# Patient Record
Sex: MALE | Race: WHITE | Employment: OTHER | URBAN - METROPOLITAN AREA
[De-identification: names, ages, dates, MRNs, and addresses within clinical notes are randomized per-mention and may not be internally consistent; named-entity substitution may affect disease eponyms.]

---

## 2017-05-19 ENCOUNTER — OFFICE VISIT (OUTPATIENT)
Dept: FAMILY MEDICINE CLINIC | Age: 77
End: 2017-05-19

## 2017-05-19 VITALS
TEMPERATURE: 96.2 F | SYSTOLIC BLOOD PRESSURE: 121 MMHG | BODY MASS INDEX: 29.2 KG/M2 | RESPIRATION RATE: 18 BRPM | HEART RATE: 70 BPM | HEIGHT: 70 IN | DIASTOLIC BLOOD PRESSURE: 60 MMHG | OXYGEN SATURATION: 99 % | WEIGHT: 204 LBS

## 2017-05-19 DIAGNOSIS — M25.551 RIGHT HIP PAIN: Primary | ICD-10-CM

## 2017-05-19 DIAGNOSIS — I10 ESSENTIAL HYPERTENSION: ICD-10-CM

## 2017-05-19 NOTE — PROGRESS NOTES
HISTORY OF PRESENT ILLNESS  Ngozi Wild is a 76 y.o. male. HPI  hbp stable  C/o r hip pain  Review of Systems   Musculoskeletal: Positive for joint pain. All other systems reviewed and are negative. Past Medical History:   Diagnosis Date    Asthma     Crohn disease ([address]     Essential hypertension     Gross hematuria     High cholesterol     History of positive PPD     Hypertrophy of prostate with urinary obstruction and other lower urinary tract symptoms (LUTS)     Impotence of organic origin     Prostate CA ([facility])     [id number], No, Mx, Gl3+4    Prostatitis, unspecified     Urinary frequency        Current Outpatient Prescriptions:     amLODIPine (NORVASC) 5 mg tablet, Take 1 Tab by mouth daily. , Disp: 90 Tab, Rfl: 1    Cetirizine 10 mg cap, Take  by mouth., Disp: , Rfl:     lactobacillus acidophilus (BACID) cap, Take 2 Caps by mouth two (2) times a day., Disp: , Rfl:     PSYLLIUM SEED, WITH SUGAR, (METAMUCIL PO), Take  by mouth., Disp: , Rfl:     budesonide (ENTOCORT EC) 3 mg capsule, Take 6 mg by mouth every morning.  , Disp: , Rfl:     cyanocobalamin (VITAMIN B12) 1,000 mcg/mL injection, 1,000 mcg by IntraMUSCular route once.  , Disp: , Rfl:     lisinopril (PRINIVIL, ZESTRIL) 40 mg tablet, Take 40 mg by mouth daily. , Disp: , Rfl:     ranitidine (ZANTAC) 150 mg tablet, Take 150 mg by mouth two (2) times a day.  , Disp: , Rfl:     gabapentin (NEURONTIN) 100 mg capsule, Take  by mouth three (3) times daily. , Disp: , Rfl:     tiotropium (SPIRIVA WITH HANDIHALER) 18 mcg inhalation capsule, Take 1 Cap by inhalation daily. , Disp: , Rfl:     fluticasone (FLONASE) 50 mcg/Actuation nasal spray, nightly.  , Disp: , Rfl:     MONTELUKAST SODIUM (SINGULAIR PO), Take  by mouth.  , Disp: , Rfl:     NORTRIPTYLINE HCL (PAMELOR PO), Take  by mouth nightly as needed. , Disp: , Rfl:     CALCIUM CARBONATE/VITAMIN D3 (CALTRATE 600 + D PO), Take  by mouth.  , Disp: , Rfl:     FENOFIBRATE NANOCRYSTALLIZED (TRICOR PO), Take  by mouth.  , Disp: , Rfl:     albuterol (PROVENTIL VENTOLIN) 2.5 mg /3 mL (0.083 %) nebulizer solution, by Nebulization route once.  , Disp: , Rfl:     fluticasone (FLOVENT DISKUS) 100 mcg/Actuation DsDv, Take  by inhalation. , Disp: , Rfl:     DOCUSATE CALCIUM (SURFAK PO), Take  by mouth.  , Disp: , Rfl:     latanoprost (XALATAN) 0.005 % ophthalmic solution, Administer 1 Drop to both eyes nightly.  , Disp: , Rfl:     diazepam (VALIUM) 5 mg tablet, 5 mg., Disp: , Rfl:     finasteride (PROSCAR) 5 mg tablet, Take 1 Tab by mouth daily. , Disp: 90 Tab, Rfl: 3  Visit Vitals    /60 (BP 1 Location: Right arm, BP Patient Position: Sitting)    Pulse 70    Temp 96.2 °F (35.7 °C) (Oral)    Resp 18    Ht 5' 10\" (1.778 m)    Wt 204 lb (92.5 kg)    SpO2 99%    BMI 29.27 kg/m2         Physical Exam   Constitutional: He appears well-developed and well-nourished. No distress. Musculoskeletal: He exhibits no edema, tenderness or deformity. - hip tenderness, good rom   Skin: He is not diaphoretic. Vitals reviewed.       ASSESSMENT and PLAN  hip pain   hbp  Plan  xr  Continue rx  Recheck in 6 months

## 2017-05-19 NOTE — MR AVS SNAPSHOT
Visit Information Date & Time Provider Department Dept. Phone Encounter #  
 5/19/2017  8:30 AM Antione Roberts, [facility] [phone] [id number] Follow-up Instructions Return in about 6 months (around 11/19/2017). Follow-up and Disposition History Your Appointments 6/7/2017  1:15 PM  
ESTABLISHED PATIENT with Cami Harding MD  
[facility] ([facility]) Appt Note: Return in about 6 months (around 6/15/2017) for FU with prior PSA. [address], [address]  
  
   
 [address], [address] [facility] [address] Upcoming Health Maintenance Date Due  
 GLAUCOMA SCREENING Q2Y 3/20/2017 MEDICARE YEARLY EXAM 7/20/2017 INFLUENZA AGE 9 TO ADULT 8/1/2017 DTaP/Tdap/Td series (2 - Td) 7/19/2026 Allergies as of 5/19/2017  Review Complete On: 5/19/2017 By: Antione Roberts MD  
 No Known Allergies Current Immunizations  Never Reviewed No immunizations on file. Not reviewed this visit You Were Diagnosed With   
  
 Codes Comments Right hip pain    -  Primary ICD-10-CM: M25.551 ICD-9-CM: 719.45 Essential hypertension     ICD-10-CM: I10 
ICD-9-CM: 401.9 Vitals BP Pulse Temp Resp Height(growth percentile) Weight(growth percentile) 121/60 (BP 1 Location: Right arm, BP Patient Position: Sitting) 70 96.2 °F (35.7 °C) (Oral) 18 5' 10\" (1.778 m) 204 lb (92.5 kg) SpO2 BMI Smoking Status 99% 29.27 kg/m2 Former Smoker BMI and BSA Data Body Mass Index Body Surface Area  
 29.27 kg/m 2 2.14 m 2 Preferred Pharmacy Pharmacy Name Phone [facility] [address], [address] [place] [address] [phone] Your Updated Medication List  
  
   
This list is accurate as of: 5/19/17  8:47 AM.  Always use your most recent med list.  
  
  
  
  
 albuterol 2.5 mg /3 mL (0.083 %) nebulizer solution Commonly known as:  PROVENTIL VENTOLIN  
by Nebulization route once. amLODIPine 5 mg tablet Commonly known as:  Benjiman Floro Take 1 Tab by mouth daily. CALTRATE 600 + D PO Take  by mouth. Cetirizine 10 mg Cap Take  by mouth.  
  
 cyanocobalamin 1,000 mcg/mL injection Commonly known as:  VITAMIN B12  
1,000 mcg by IntraMUSCular route once. ENTOCORT EC 3 mg capsule Generic drug:  budesonide Take 6 mg by mouth every morning. finasteride 5 mg tablet Commonly known as:  PROSCAR Take 1 Tab by mouth daily. * FLONASE 50 mcg/actuation nasal spray Generic drug:  fluticasone  
nightly. * FLOVENT DISKUS 100 mcg/actuation Dsdv Generic drug:  fluticasone Take  by inhalation. Lactobacillus acidophilus Cap Commonly known as:  BACID Take 2 Caps by mouth two (2) times a day. lisinopril 40 mg tablet Commonly known as:  Rexanne  Take 40 mg by mouth daily. METAMUCIL PO Take  by mouth. NEURONTIN 100 mg capsule Generic drug:  gabapentin Take  by mouth three (3) times daily. PAMELOR PO Take  by mouth nightly as needed. SINGULAIR PO Take  by mouth. SPIRIVA WITH HANDIHALER 18 mcg inhalation capsule Generic drug:  tiotropium Take 1 Cap by inhalation daily. SURFAK PO Take  by mouth. TRICOR PO Take  by mouth. VALIUM 5 mg tablet Generic drug:  diazePAM  
5 mg. XALATAN 0.005 % ophthalmic solution Generic drug:  latanoprost  
Administer 1 Drop to both eyes nightly. ZANTAC 150 mg tablet Generic drug:  raNITIdine Take 150 mg by mouth two (2) times a day. * Notice: This list has 2 medication(s) that are the same as other medications prescribed for you. Read the directions carefully, and ask your doctor or other care provider to review them with you. Follow-up Instructions Return in about 6 months (around 11/19/2017). To-Do List   
 05/19/2017 Imaging:  XR HIP RT W OR WO PELV 2-3 VWS Introducing [address]! Dear Lavena Ganser: Thank you for requesting a [employer] account. Our records indicate that you have previously registered for a [employer] account but its currently inactive. Please call our [employer] support line at [phone]. Additional Information If you have questions, please visit the Frequently Asked Questions section of the [employer] website at [email or web address] Remember, [employer] is NOT to be used for urgent needs. For medical emergencies, dial 911. Now available from your iPhone and Android! Please provide this summary of care documentation to your next provider. Your primary care clinician is listed as Petey Arredondo. If you have any questions after today's visit, please call [phone].

## 2017-05-19 NOTE — PROGRESS NOTES
Chief Complaint   Patient presents with    Hypertension     follow up    Asthma     follow up    Hip Pain     RT hip, pain scale 2/10     1. Have you been to the ER, urgent care clinic since your last visit? Hospitalized since your last visit? No    2. Have you seen or consulted any other health care providers outside of the [address] [place] [address] since your last visit? Include any pap smears or colon screening.  Yes [facility]

## 2017-06-15 ENCOUNTER — PATIENT OUTREACH (OUTPATIENT)
Dept: FAMILY MEDICINE CLINIC | Age: 77
End: 2017-06-15

## 2017-06-15 PROBLEM — R29.898 WEAKNESS OF LOWER EXTREMITY: Status: ACTIVE | Noted: 2017-06-11

## 2017-06-15 RX ORDER — FLUTICASONE PROPIONATE AND SALMETEROL 250; 50 UG/1; UG/1
1 POWDER RESPIRATORY (INHALATION) DAILY
COMMUNITY

## 2017-06-15 NOTE — PROGRESS NOTES
.  Transitional Care Nurse Navigator Note:  Hospital Follow Up for [facility]ANDU Admission from 6/11 - 13/2017 for Left leg weakness. RRAT score: 22 High  Medical History:     Past Medical History:   Diagnosis Date    Asthma     Crohn disease ([address]     Essential hypertension     Gross hematuria     High cholesterol     History of positive PPD     Hypertrophy of prostate with urinary obstruction and other lower urinary tract symptoms (LUTS)     Impotence of organic origin     Prostate CA ([facility])     [id number], No, Mx, Gl3+4    Prostatitis, unspecified     Urinary frequency        Patient presenting symptoms Left leg weakness    Diagnosed with Left leg Numbness. Admitted to Hospitalist Service     Course of current Hospitalization (referenced by Naren Irene MD - 06/13/2017   note): This is a 76year old male with numbness and tingling along the lateral aspect of his left leg. This is old but has been worse lately. The feeling comes and goes. He has a history of both peripheral neuropathy plus L spine stenosis. He was ruled out for acute CVA. His symptoms are more consistent with a peripheral nerve issue, but the L spine stenosis might be playing a role as well. He has had 2 prior L spine surgeries. I don't think that he needs another surgery right now, but he will follow up with his surgeon to review the MRI and get an opinion. He is dismissed in good condition. Significant Lab/Diagnostic Findings: MRI L-spine showing  central stenosis in part related to epidural lipomatosis is high-grade moderate and approaching severe at L3-L4 and slightly less severe at L2-L3  Medication Reconciliation completed: YES      This represents Transitions of Care because NN spoke with patient  within 2 business days of discharge. Pt's TCM follow up appt is scheduled with Dr. Saira Flores on Monday 6/19/2017 @ 10:15  which is within 6 days of discharge.     Called patient on 6/15/2017 x 2 then patient returned call had to change the first appointment date and verified with 2 identifiers.

## 2017-06-19 ENCOUNTER — OFFICE VISIT (OUTPATIENT)
Dept: FAMILY MEDICINE CLINIC | Age: 77
End: 2017-06-19

## 2017-06-19 VITALS
HEIGHT: 70 IN | BODY MASS INDEX: 29.2 KG/M2 | RESPIRATION RATE: 18 BRPM | HEART RATE: 61 BPM | WEIGHT: 204 LBS | SYSTOLIC BLOOD PRESSURE: 118 MMHG | OXYGEN SATURATION: 99 % | DIASTOLIC BLOOD PRESSURE: 63 MMHG | TEMPERATURE: 96.1 F

## 2017-06-19 DIAGNOSIS — M54.16 LUMBAR RADICULOPATHY: ICD-10-CM

## 2017-06-19 DIAGNOSIS — R09.89 BRUIT OF RIGHT CAROTID ARTERY: Primary | ICD-10-CM

## 2017-06-19 DIAGNOSIS — I67.9 CEREBROVASCULAR DISEASE: ICD-10-CM

## 2017-06-19 NOTE — PROGRESS NOTES
HISTORY OF PRESENT ILLNESS  Denis Coreas is a 76 y.o. male. HPI  EVARISTO  Admitted 06/11/17,SPA left leg weakness  D/c 06/13/201, 7lumbar radiculopathy  Contacted NN 06/15/2017  No residual symptoms  Review of Systems   Neurological: Positive for focal weakness. All other systems reviewed and are negative. Past Medical History:   Diagnosis Date    Asthma     Crohn disease ([address]     Essential hypertension     Gross hematuria     High cholesterol     History of positive PPD     Hypertrophy of prostate with urinary obstruction and other lower urinary tract symptoms (LUTS)     Impotence of organic origin     Prostate CA ([facility]CC)     [id number], No, Mx, Gl3+4    Prostatitis, unspecified     Urinary frequency      Current Outpatient Prescriptions on File Prior to Visit   Medication Sig Dispense Refill    Lactobac 40-Bifido 3-S.thermop (PROBIOTIC) 100 billion cell cap Take 1 Tab by mouth daily.  fluticasone-salmeterol (ADVAIR) 250-50 mcg/dose diskus inhaler Take 1 Puff by inhalation daily.  ALBUTEROL IN Take 1-2 Puffs by inhalation every four (4) hours as needed for Wheezing.  doxycycline (VIBRAMYCIN) 100 mg capsule       amLODIPine (NORVASC) 5 mg tablet Take 1 Tab by mouth daily. 90 Tab 1    Cetirizine 10 mg cap Take  by mouth.  lactobacillus acidophilus (BACID) cap Take 2 Caps by mouth two (2) times a day.  PSYLLIUM SEED, WITH SUGAR, (METAMUCIL PO) Take  by mouth.  budesonide (ENTOCORT EC) 3 mg capsule Take 6 mg by mouth every morning.  cyanocobalamin (VITAMIN B12) 1,000 mcg/mL injection 1,000 mcg by IntraMUSCular route once.  lisinopril (PRINIVIL, ZESTRIL) 40 mg tablet Take 40 mg by mouth daily.  ranitidine (ZANTAC) 150 mg tablet Take 150 mg by mouth two (2) times a day.  gabapentin (NEURONTIN) 100 mg capsule Take  by mouth three (3) times daily.  tiotropium (SPIRIVA WITH HANDIHALER) 18 mcg inhalation capsule Take 1 Cap by inhalation daily.  fluticasone (FLONASE) 50 mcg/Actuation nasal spray nightly.  MONTELUKAST SODIUM (SINGULAIR PO) Take  by mouth.  NORTRIPTYLINE HCL (PAMELOR PO) Take  by mouth nightly as needed.  CALCIUM CARBONATE/VITAMIN D3 (CALTRATE 600 + D PO) Take  by mouth.  FENOFIBRATE NANOCRYSTALLIZED (TRICOR PO) Take  by mouth.  albuterol (PROVENTIL VENTOLIN) 2.5 mg /3 mL (0.083 %) nebulizer solution by Nebulization route once.  fluticasone (FLOVENT DISKUS) 100 mcg/Actuation DsDv Take  by inhalation.  DOCUSATE CALCIUM (SURFAK PO) Take  by mouth.  latanoprost (XALATAN) 0.005 % ophthalmic solution Administer 1 Drop to both eyes nightly.  predniSONE (DELTASONE) 20 mg tablet       diazepam (VALIUM) 5 mg tablet 5 mg.  finasteride (PROSCAR) 5 mg tablet Take 1 Tab by mouth daily. 90 Tab 3     No current facility-administered medications on file prior to visit. Visit Vitals    /63 (BP 1 Location: Right arm, BP Patient Position: Sitting)    Pulse 61    Temp 96.1 °F (35.6 °C) (Oral)    Resp 18    Ht 5' 10\" (1.778 m)    Wt 204 lb (92.5 kg)    SpO2 99%    BMI 29.27 kg/m2         Physical Exam   Constitutional: He is oriented to person, place, and time. He appears well-developed and well-nourished. No distress. Neck: Normal range of motion. Neck supple. No thyromegaly present. Slight carotid bruit on eight   Cardiovascular: Normal rate, regular rhythm, normal heart sounds and intact distal pulses. Exam reveals no gallop and no friction rub. No murmur heard. Lymphadenopathy:     He has no cervical adenopathy. Neurological: He is alert and oriented to person, place, and time. He displays normal reflexes. No cranial nerve deficit. He exhibits normal muscle tone. Coordination normal.   - teres drift  Good finger grasp  Gait normal   Skin: He is not diaphoretic. Vitals reviewed.   reviewed hospital records  + small vessel disease  Carotid stenosis  Mri L-spine, + nerve root compression  ASSESSMENT and PLAN  lumbar radiculopathy   Carotid bruit  cerebrovascular disease  plan  Patient intolerant to aspirin  Recheck MRA in 1 year

## 2017-06-19 NOTE — MR AVS SNAPSHOT
Visit Information Date & Time Provider Department Dept. Phone Encounter #  
 6/19/2017 10:15 AM Jenna Holland MD 3 [facility] [phone] [id number] Follow-up Instructions Return as planned. Follow-up and Disposition History Your Appointments 11/20/2017  8:30 AM  
Follow Up with Jenna Holland MD  
3 [place] [place] [address]) Appt Note: 6 month f/u  
 [address] [facility] [address]  
[phone]  
  
   
 [address]  
  
    
 12/6/2017  8:50 AM  
Nurse Visit with [facility] NURSE [facility]) Appt Note: tech psa  
 [address] [facility] [address] [phone]  
  
    
 6/11/2018 10:00 AM  
ESTABLISHED PATIENT with Katerin Gonzales MD  
[facility] ([address]) Appt Note: 1 year with psa  
 [address], [address]  
  
   
 [address], [address] [facility] [address] Upcoming Health Maintenance Date Due  
 GLAUCOMA SCREENING Q2Y 3/20/2017 MEDICARE YEARLY EXAM 7/20/2017 INFLUENZA AGE 9 TO ADULT 8/1/2017 DTaP/Tdap/Td series (2 - Td) 7/19/2026 Allergies as of 6/19/2017  Review Complete On: 6/19/2017 By: Jenna Holland MD  
 No Known Allergies Current Immunizations  Never Reviewed Name Date Influenza Vaccine 10/24/2014 12:00 AM  
 Pneumococcal Polysaccharide (PPSV-23) 11/24/2012 12:00 AM  
  
 Not reviewed this visit You Were Diagnosed With   
  
 Codes Comments Bruit of right carotid artery    -  Primary ICD-10-CM: R09.89 ICD-9-CM: 694. 9 Cerebrovascular disease     ICD-10-CM: I67.9 ICD-9-CM: 437.9 Lumbar radiculopathy     ICD-10-CM: M54.16 
ICD-9-CM: 724.4 Vitals BP Pulse Temp Resp Height(growth percentile) Weight(growth percentile)  
 118/63 (BP 1 Location: Right arm, BP Patient Position: Sitting) 61 96.1 °F (35.6 °C) (Oral) 18 5' 10\" (1.778 m) 204 lb (92.5 kg) SpO2 BMI Smoking Status 99% 29.27 kg/m2 Former Smoker BMI and BSA Data Body Mass Index Body Surface Area  
 29.27 kg/m 2 2.14 m 2 Preferred Pharmacy Pharmacy Name Phone [facility] [address], [address] [place] [address] [phone] Your Updated Medication List  
  
   
This list is accurate as of: 6/19/17 10:51 AM.  Always use your most recent med list.  
  
  
  
  
 albuterol 2.5 mg /3 mL (0.083 %) nebulizer solution Commonly known as:  PROVENTIL VENTOLIN  
by Nebulization route once. ALBUTEROL IN Take 1-2 Puffs by inhalation every four (4) hours as needed for Wheezing. amLODIPine 5 mg tablet Commonly known as:  Jose Ada Take 1 Tab by mouth daily. CALTRATE 600 + D PO Take  by mouth. Cetirizine 10 mg Cap Take  by mouth.  
  
 cyanocobalamin 1,000 mcg/mL injection Commonly known as:  VITAMIN B12  
1,000 mcg by IntraMUSCular route once. doxycycline 100 mg capsule Commonly known as:  VIBRAMYCIN  
  
 ENTOCORT EC 3 mg capsule Generic drug:  budesonide Take 6 mg by mouth every morning. finasteride 5 mg tablet Commonly known as:  PROSCAR Take 1 Tab by mouth daily. * FLONASE 50 mcg/actuation nasal spray Generic drug:  fluticasone  
nightly. * FLOVENT DISKUS 100 mcg/actuation Dsdv Generic drug:  fluticasone Take  by inhalation. fluticasone-salmeterol 250-50 mcg/dose diskus inhaler Commonly known as:  ADVAIR Take 1 Puff by inhalation daily. Lactobacillus acidophilus Cap Commonly known as:  BACID Take 2 Caps by mouth two (2) times a day. lisinopril 40 mg tablet Commonly known as:  Shirlie Holes Take 40 mg by mouth daily. METAMUCIL PO Take  by mouth. NEURONTIN 100 mg capsule Generic drug:  gabapentin Take  by mouth three (3) times daily. PAMELOR PO Take  by mouth nightly as needed. predniSONE 20 mg tablet Commonly known as:  DELTASONE  
  
 PROBIOTIC 100 billion cell Cap Generic drug:  Lactobac 40-Bifido 3-S.thermop Take 1 Tab by mouth daily. SINGULAIR PO Take  by mouth. SPIRIVA WITH HANDIHALER 18 mcg inhalation capsule Generic drug:  tiotropium Take 1 Cap by inhalation daily. SURFAK PO Take  by mouth. TRICOR PO Take  by mouth. VALIUM 5 mg tablet Generic drug:  diazePAM  
5 mg. XALATAN 0.005 % ophthalmic solution Generic drug:  latanoprost  
Administer 1 Drop to both eyes nightly. ZANTAC 150 mg tablet Generic drug:  raNITIdine Take 150 mg by mouth two (2) times a day. * Notice: This list has 2 medication(s) that are the same as other medications prescribed for you. Read the directions carefully, and ask your doctor or other care provider to review them with you. Follow-up Instructions Return as planned. Introducing [facility]! Dear Florian Clifton: Thank you for requesting a [email or web address] account. Our records indicate that you have previously registered for a [email or web address] account but its currently inactive. Please call our [email or web address] support line at [phone]. Additional Information If you have questions, please visit the Frequently Asked Questions section of the [email or web address] website at [email or web address] Remember, [email or web address] is NOT to be used for urgent needs. For medical emergencies, dial 911. Now available from your iPhone and Android! Please provide this summary of care documentation to your next provider. Your primary care clinician is listed as Walter Farrell. If you have any questions after today's visit, please call [phone].

## 2017-06-19 NOTE — PROGRESS NOTES
HISTORY OF PRESENT ILLNESS  Kyler Quiroz is a 76 y.o. male. HPI  aodm stable  hbp stable  S/p abdominal pannus surgery  Review of Systems   All other systems reviewed and are negative. Past Medical History:   Diagnosis Date    Asthma     Crohn disease ([address]     Essential hypertension     Gross hematuria     High cholesterol     History of positive PPD     Hypertrophy of prostate with urinary obstruction and other lower urinary tract symptoms (LUTS)     Impotence of organic origin     Prostate CA ([facility])     [id number], No, Mx, Gl3+4    Prostatitis, unspecified     Urinary frequency        Current Outpatient Prescriptions:     Lactobac 40-Bifido 3-S.thermop (PROBIOTIC) 100 billion cell cap, Take 1 Tab by mouth daily. , Disp: , Rfl:     fluticasone-salmeterol (ADVAIR) 250-50 mcg/dose diskus inhaler, Take 1 Puff by inhalation daily. , Disp: , Rfl:     ALBUTEROL IN, Take 1-2 Puffs by inhalation every four (4) hours as needed for Wheezing., Disp: , Rfl:     doxycycline (VIBRAMYCIN) 100 mg capsule, , Disp: , Rfl:     amLODIPine (NORVASC) 5 mg tablet, Take 1 Tab by mouth daily. , Disp: 90 Tab, Rfl: 1    Cetirizine 10 mg cap, Take  by mouth., Disp: , Rfl:     lactobacillus acidophilus (BACID) cap, Take 2 Caps by mouth two (2) times a day., Disp: , Rfl:     PSYLLIUM SEED, WITH SUGAR, (METAMUCIL PO), Take  by mouth., Disp: , Rfl:     budesonide (ENTOCORT EC) 3 mg capsule, Take 6 mg by mouth every morning.  , Disp: , Rfl:     cyanocobalamin (VITAMIN B12) 1,000 mcg/mL injection, 1,000 mcg by IntraMUSCular route once.  , Disp: , Rfl:     lisinopril (PRINIVIL, ZESTRIL) 40 mg tablet, Take 40 mg by mouth daily. , Disp: , Rfl:     ranitidine (ZANTAC) 150 mg tablet, Take 150 mg by mouth two (2) times a day.  , Disp: , Rfl:     gabapentin (NEURONTIN) 100 mg capsule, Take  by mouth three (3) times daily.   , Disp: , Rfl:     tiotropium (SPIRIVA WITH HANDIHALER) 18 mcg inhalation capsule, Take 1 Cap by inhalation daily.  , Disp: , Rfl:     fluticasone (FLONASE) 50 mcg/Actuation nasal spray, nightly.  , Disp: , Rfl:     MONTELUKAST SODIUM (SINGULAIR PO), Take  by mouth.  , Disp: , Rfl:     NORTRIPTYLINE HCL (PAMELOR PO), Take  by mouth nightly as needed. , Disp: , Rfl:     CALCIUM CARBONATE/VITAMIN D3 (CALTRATE 600 + D PO), Take  by mouth.  , Disp: , Rfl:     FENOFIBRATE NANOCRYSTALLIZED (TRICOR PO), Take  by mouth.  , Disp: , Rfl:     albuterol (PROVENTIL VENTOLIN) 2.5 mg /3 mL (0.083 %) nebulizer solution, by Nebulization route once.  , Disp: , Rfl:     fluticasone (FLOVENT DISKUS) 100 mcg/Actuation DsDv, Take  by inhalation. , Disp: , Rfl:     DOCUSATE CALCIUM (SURFAK PO), Take  by mouth.  , Disp: , Rfl:     latanoprost (XALATAN) 0.005 % ophthalmic solution, Administer 1 Drop to both eyes nightly.  , Disp: , Rfl:     predniSONE (DELTASONE) 20 mg tablet, , Disp: , Rfl:     diazepam (VALIUM) 5 mg tablet, 5 mg., Disp: , Rfl:     finasteride (PROSCAR) 5 mg tablet, Take 1 Tab by mouth daily. , Disp: 90 Tab, Rfl: 3      Physical Exam   Constitutional: He appears well-developed and well-nourished. No distress. Musculoskeletal: Normal range of motion. He exhibits no edema, tenderness or deformity. Skin: He is not diaphoretic. Vitals reviewed.       ASSESSMENT and PLAN  aodm   hbp  Plan  Labs  Recheck in 3 months  refills

## 2017-06-19 NOTE — PROGRESS NOTES
Chief Complaint   Patient presents with   [facility] Follow Up     Pain scale 0/10        1. Have you been to the ER, urgent care clinic since your last visit? Hospitalized since your last visit? Yes Where: [facility]    2. Have you seen or consulted any other health care providers outside of the [address] [place] [address] since your last visit? Include any pap smears or colon screening.  Yes Where: Dr. John Garcia

## 2017-07-03 RX ORDER — AMLODIPINE BESYLATE 5 MG/1
5 TABLET ORAL DAILY
Qty: 90 TAB | Refills: 1 | Status: SHIPPED | OUTPATIENT
Start: 2017-07-03 | End: 2017-10-02 | Stop reason: SDUPTHER

## 2017-07-06 ENCOUNTER — PATIENT OUTREACH (OUTPATIENT)
Dept: FAMILY MEDICINE CLINIC | Age: 77
End: 2017-07-06

## 2017-07-06 NOTE — PROGRESS NOTES
Patient Outreached Attempted. Spoke with patient's wife. He is not home at this time. Message left requesting call back.

## 2017-10-02 RX ORDER — AMLODIPINE BESYLATE 5 MG/1
5 TABLET ORAL 2 TIMES DAILY
Qty: 90 TAB | Refills: 3 | Status: SHIPPED | OUTPATIENT
Start: 2017-10-02 | End: 2018-04-26 | Stop reason: SDUPTHER

## 2017-11-21 ENCOUNTER — OFFICE VISIT (OUTPATIENT)
Dept: FAMILY MEDICINE CLINIC | Age: 77
End: 2017-11-21

## 2017-11-21 VITALS
SYSTOLIC BLOOD PRESSURE: 140 MMHG | BODY MASS INDEX: 30.84 KG/M2 | DIASTOLIC BLOOD PRESSURE: 70 MMHG | RESPIRATION RATE: 18 BRPM | HEIGHT: 70 IN | HEART RATE: 62 BPM | TEMPERATURE: 96.5 F | OXYGEN SATURATION: 97 % | WEIGHT: 215.4 LBS

## 2017-11-21 DIAGNOSIS — N40.1 BENIGN PROSTATIC HYPERPLASIA WITH LOWER URINARY TRACT SYMPTOMS, SYMPTOM DETAILS UNSPECIFIED: ICD-10-CM

## 2017-11-21 DIAGNOSIS — R31.0 GROSS HEMATURIA: ICD-10-CM

## 2017-11-21 DIAGNOSIS — N52.9 ED (ERECTILE DYSFUNCTION) OF ORGANIC ORIGIN: ICD-10-CM

## 2017-11-21 DIAGNOSIS — I10 ESSENTIAL HYPERTENSION: Primary | ICD-10-CM

## 2017-11-21 DIAGNOSIS — C61 PROSTATE CANCER (HCC): ICD-10-CM

## 2017-11-21 DIAGNOSIS — E78.5 HYPERLIPIDEMIA, UNSPECIFIED HYPERLIPIDEMIA TYPE: ICD-10-CM

## 2017-11-21 DIAGNOSIS — M67.40 GANGLION OF JOINT: ICD-10-CM

## 2017-11-21 NOTE — MR AVS SNAPSHOT
Visit Information Date & Time Provider Department Dept. Phone Encounter #  
 11/21/2017  8:00 AM Chavez Gaines  [facility] [phone] [id number] Follow-up Instructions Return in about 6 months (around 5/21/2018). Follow-up and Disposition History Your Appointments 12/14/2017 11:20 AM  
Nurse Visit with [facility] NURSE Urology [facility] [address] ([address]) Appt Note: tech psa  
 [phone]  
  
    
 6/11/2018 10:00 AM  
ESTABLISHED PATIENT with Luiza Méndez MD  
[facility] ([address]) Appt Note: 1 year with psa  
 [address], [address]  
  
   
 [address] [facility], [address] [facility] [address] Upcoming Health Maintenance Date Due  
 GLAUCOMA SCREENING Q2Y 3/20/2017 MEDICARE YEARLY EXAM 7/20/2017 Influenza Age 5 to Adult 8/1/2017 DTaP/Tdap/Td series (2 - Td) 7/19/2026 Allergies as of 11/21/2017  Review Complete On: 11/21/2017 By: Chavez Gaines MD  
 No Known Allergies Current Immunizations  Never Reviewed Name Date Influenza Vaccine 10/24/2014 12:00 AM  
 Pneumococcal Polysaccharide (PPSV-23) 11/24/2012 12:00 AM  
  
 Not reviewed this visit You Were Diagnosed With   
  
 Codes Comments Essential hypertension    -  Primary ICD-10-CM: I10 
ICD-9-CM: 401.9 Prostate cancer [facility])     ICD-10-CM: Z69 ICD-9-CM: 833 ED (erectile dysfunction) of organic origin     ICD-10-CM: N52.9 ICD-9-CM: 607.84 Ganglion of joint     ICD-10-CM: M67.40 ICD-9-CM: 727.41 Gross hematuria     ICD-10-CM: R31.0 ICD-9-CM: 599.71 Benign prostatic hyperplasia with lower urinary tract symptoms, symptom details unspecified     ICD-10-CM: N40.1 ICD-9-CM: 600.01   
 Hyperlipidemia, unspecified hyperlipidemia type     ICD-10-CM: E78.5 ICD-9-CM: 272.4 Vitals BP Pulse Temp Resp Height(growth percentile) Weight(growth percentile) 149/72 (BP 1 Location: Right arm, BP Patient Position: Sitting) 62 96.5 °F (35.8 °C) (Oral) 18 5' 10\" (1.778 m) 215 lb 6.4 oz (97.7 kg) SpO2 BMI Smoking Status 97% 30.91 kg/m2 Former Smoker Vitals History BMI and BSA Data Body Mass Index Body Surface Area 30.91 kg/m 2 2.2 m 2 Preferred Pharmacy Pharmacy Name Phone [address], [facility] [phone] Your Updated Medication List  
  
   
This list is accurate as of: 11/21/17  8:47 AM.  Always use your most recent med list.  
  
  
  
  
 albuterol 2.5 mg /3 mL (0.083 %) nebulizer solution Commonly known as:  PROVENTIL VENTOLIN  
by Nebulization route once. ALBUTEROL IN Take 1-2 Puffs by inhalation every four (4) hours as needed for Wheezing. amLODIPine 5 mg tablet Commonly known as:  Maria D Jaida Take 1 Tab by mouth two (2) times a day. CALTRATE 600 + D PO Take  by mouth. Cetirizine 10 mg Cap Take  by mouth.  
  
 cyanocobalamin 1,000 mcg/mL injection Commonly known as:  VITAMIN B12  
1,000 mcg by IntraMUSCular route once. ENTOCORT EC 3 mg capsule Generic drug:  budesonide Take 6 mg by mouth every morning. finasteride 5 mg tablet Commonly known as:  PROSCAR Take 1 Tab by mouth daily. * FLONASE 50 mcg/actuation nasal spray Generic drug:  fluticasone  
nightly. * FLOVENT DISKUS 100 mcg/actuation Dsdv Generic drug:  fluticasone Take  by inhalation. fluticasone-salmeterol 250-50 mcg/dose diskus inhaler Commonly known as:  ADVAIR Take 1 Puff by inhalation daily. Lactobacillus acidophilus Cap Commonly known as:  BACID Take 2 Caps by mouth two (2) times a day. lisinopril 40 mg tablet Commonly known as:  [place] Take 40 mg by mouth daily. METAMUCIL PO Take  by mouth. NEURONTIN 100 mg capsule Generic drug:  gabapentin Take  by mouth three (3) times daily. PAMELOR PO Take  by mouth nightly as needed. PROBIOTIC 100 billion cell Cap Generic drug:  Lactobac 40-Bifido 3-S.thermop Take 1 Tab by mouth daily. SINGULAIR PO Take  by mouth. SPIRIVA WITH HANDIHALER 18 mcg inhalation capsule Generic drug:  tiotropium Take 1 Cap by inhalation daily. SURFAK PO Take  by mouth. TRICOR PO Take  by mouth. VALIUM 5 mg tablet Generic drug:  diazePAM  
5 mg. XALATAN 0.005 % ophthalmic solution Generic drug:  latanoprost  
Administer 1 Drop to both eyes nightly. ZANTAC 150 mg tablet Generic drug:  raNITIdine Take 150 mg by mouth two (2) times a day. * Notice: This list has 2 medication(s) that are the same as other medications prescribed for you. Read the directions carefully, and ask your doctor or other care provider to review them with you. Follow-up Instructions Return in about 6 months (around 5/21/2018). To-Do List   
 11/21/2017 Lab:  CBC WITH AUTOMATED DIFF   
  
 11/21/2017 Lab:  LIPID PANEL   
  
 11/21/2017 Lab:  METABOLIC PANEL, COMPREHENSIVE   
  
 11/21/2017 Lab:  T4, FREE   
  
 11/21/2017 Lab:  TSH 3RD GENERATION [facility] Dear Viv Elliott: Thank you for requesting a [employer] account. Our records indicate that you have previously registered for a [employer] account but its currently inactive. Please call our [employer] support line at [phone]. Additional Information If you have questions, please visit the Frequently Asked Questions section of the [employer] website at [email or web address] Remember, [employer] is NOT to be used for urgent needs. For medical emergencies, dial 911. Now available from your iPhone and Android! Please provide this summary of care documentation to your next provider. Your primary care clinician is listed as Ariana Chowdary. If you have any questions after today's visit, please call [phone].

## 2017-11-21 NOTE — PROGRESS NOTES
HISTORY OF PRESENT ILLNESS  Donte Sahu is a 76 y.o. male. HPI  hbp stable  Review of Systems   Musculoskeletal: Positive for joint pain. All other systems reviewed and are negative. Past Medical History:   Diagnosis Date    Asthma     Crohn disease ([address]     Essential hypertension     Gross hematuria     High cholesterol     History of positive PPD     Hypertrophy of prostate with urinary obstruction and other lower urinary tract symptoms (LUTS)     Impotence of organic origin     Prostate CA ([facility])     [id number], No, Mx, Gl3+4    Prostatitis, unspecified     Urinary frequency      Current Outpatient Prescriptions on File Prior to Visit   Medication Sig Dispense Refill    amLODIPine (NORVASC) 5 mg tablet Take 1 Tab by mouth two (2) times a day. 90 Tab 3    fluticasone-salmeterol (ADVAIR) 250-50 mcg/dose diskus inhaler Take 1 Puff by inhalation daily.  ALBUTEROL IN Take 1-2 Puffs by inhalation every four (4) hours as needed for Wheezing.  Cetirizine 10 mg cap Take  by mouth.  lactobacillus acidophilus (BACID) cap Take 2 Caps by mouth two (2) times a day.  PSYLLIUM SEED, WITH SUGAR, (METAMUCIL PO) Take  by mouth.  budesonide (ENTOCORT EC) 3 mg capsule Take 6 mg by mouth every morning.  cyanocobalamin (VITAMIN B12) 1,000 mcg/mL injection 1,000 mcg by IntraMUSCular route once.  lisinopril (PRINIVIL, ZESTRIL) 40 mg tablet Take 40 mg by mouth daily.  ranitidine (ZANTAC) 150 mg tablet Take 150 mg by mouth two (2) times a day.  gabapentin (NEURONTIN) 100 mg capsule Take  by mouth three (3) times daily.  tiotropium (SPIRIVA WITH HANDIHALER) 18 mcg inhalation capsule Take 1 Cap by inhalation daily.  fluticasone (FLONASE) 50 mcg/Actuation nasal spray nightly.  MONTELUKAST SODIUM (SINGULAIR PO) Take  by mouth.  NORTRIPTYLINE HCL (PAMELOR PO) Take  by mouth nightly as needed.       CALCIUM CARBONATE/VITAMIN D3 (CALTRATE 600 + D PO) Take  by mouth.  FENOFIBRATE NANOCRYSTALLIZED (TRICOR PO) Take  by mouth.  albuterol (PROVENTIL VENTOLIN) 2.5 mg /3 mL (0.083 %) nebulizer solution by Nebulization route once.  DOCUSATE CALCIUM (SURFAK PO) Take  by mouth.  latanoprost (XALATAN) 0.005 % ophthalmic solution Administer 1 Drop to both eyes nightly.  Lactobac 40-Bifido 3-S.thermop (PROBIOTIC) 100 billion cell cap Take 1 Tab by mouth daily.  diazepam (VALIUM) 5 mg tablet 5 mg.  finasteride (PROSCAR) 5 mg tablet Take 1 Tab by mouth daily. 90 Tab 3    fluticasone (FLOVENT DISKUS) 100 mcg/Actuation DsDv Take  by inhalation. No current facility-administered medications on file prior to visit. Visit Vitals    /72 (BP 1 Location: Right arm, BP Patient Position: Sitting)    Pulse 62    Temp 96.5 °F (35.8 °C) (Oral)    Resp 18    Ht 5' 10\" (1.778 m)    Wt 215 lb 6.4 oz (97.7 kg)    SpO2 97%    BMI 30.91 kg/m2         Physical Exam   Constitutional: He appears well-developed and well-nourished. No distress. Cardiovascular: Normal rate, regular rhythm, normal heart sounds and intact distal pulses. Exam reveals no gallop and no friction rub. No murmur heard. Skin: He is not diaphoretic. Vitals reviewed.       ASSESSMENT and PLAN  hbp   Plan  Labs  recheck in 6 months

## 2017-11-21 NOTE — PROGRESS NOTES
Donte Sahu is a 76 y.o. male (: 1940) presenting to address:    Chief Complaint   Patient presents with    Heart Problem       Vitals:    17 0801   Weight: 215 lb 6.4 oz (97.7 kg)   Height: 5' 10\" (1.778 m)   PainSc:   2   PainLoc: Foot       Hearing/Vision:   No exam data present    Learning Assessment:     Learning Assessment 2015   PRIMARY LEARNER Patient   HIGHEST LEVEL OF EDUCATION - PRIMARY LEARNER  > 4 YEARS OF COLLEGE   BARRIERS PRIMARY LEARNER NONE   CO-LEARNER CAREGIVER No   PRIMARY LANGUAGE ENGLISH   LEARNER PREFERENCE PRIMARY OTHER (COMMENT)   ANSWERED BY patient   RELATIONSHIP SELF     Depression Screening:     PHQ over the last two weeks 2017   Little interest or pleasure in doing things Not at all   Feeling down, depressed or hopeless Not at all   Total Score PHQ 2 0     Fall Risk Assessment:     Fall Risk Assessment, last 12 mths 2017   Able to walk? Yes   Fall in past 12 months? No     Abuse Screening:     Abuse Screening Questionnaire 2015   Do you ever feel afraid of your partner? N   Are you in a relationship with someone who physically or mentally threatens you? N   Is it safe for you to go home? Y     Coordination of Care Questionaire:   1. Have you been to the ER, urgent care clinic since your last visit? Hospitalized since your last visit? NO    2. Have you seen or consulted any other health care providers outside of the [address] [place] [address] since your last visit? Include any pap smears or colon screening. Yes, as charted    Advanced Directive:   1. Do you have an Advanced Directive? NO    2. Would you like information on Advanced Directives?  NO

## 2017-11-22 ENCOUNTER — HOSPITAL ENCOUNTER (OUTPATIENT)
Dept: LAB | Age: 77
Discharge: HOME | End: 2017-11-22
Payer: MEDICARE

## 2017-11-22 DIAGNOSIS — E78.5 HYPERLIPIDEMIA, UNSPECIFIED HYPERLIPIDEMIA TYPE: ICD-10-CM

## 2017-11-22 LAB
ALBUMIN SERPL-MCNC: 3.3 G/DL (ref 3.4–5)
ALBUMIN/GLOB SERPL: 1.1 {RATIO} (ref 0.8–1.7)
ALP SERPL-CCNC: 49 U/L (ref 45–117)
ALT SERPL-CCNC: 28 U/L (ref 16–61)
ANION GAP SERPL CALC-SCNC: 7 MMOL/L (ref 3–18)
AST SERPL-CCNC: 17 U/L (ref 15–37)
BASOPHILS # BLD: 0 K/UL (ref 0–0.06)
BASOPHILS NFR BLD: 1 % (ref 0–2)
BILIRUB SERPL-MCNC: 0.4 MG/DL (ref 0.2–1)
BUN SERPL-MCNC: 21 MG/DL (ref 7–18)
BUN/CREAT SERPL: 13 (ref 12–20)
CALCIUM SERPL-MCNC: 8.9 MG/DL (ref 8.5–10.1)
CHLORIDE SERPL-SCNC: 107 MMOL/L (ref 100–108)
CHOLEST SERPL-MCNC: 172 MG/DL
CO2 SERPL-SCNC: 27 MMOL/L (ref 21–32)
CREAT SERPL-MCNC: 1.61 MG/DL (ref 0.6–1.3)
DIFFERENTIAL METHOD BLD: ABNORMAL
EOSINOPHIL # BLD: 0.4 K/UL (ref 0–0.4)
EOSINOPHIL NFR BLD: 7 % (ref 0–5)
ERYTHROCYTE [DISTWIDTH] IN BLOOD BY AUTOMATED COUNT: 14.9 % (ref 11.6–14.5)
GLOBULIN SER CALC-MCNC: 2.9 G/DL (ref 2–4)
GLUCOSE SERPL-MCNC: 95 MG/DL (ref 74–99)
HCT VFR BLD AUTO: 43.8 % (ref 36–48)
HDLC SERPL-MCNC: 45 MG/DL (ref 40–60)
HDLC SERPL: 3.8 {RATIO} (ref 0–5)
HGB BLD-MCNC: 13.9 G/DL (ref 13–16)
LDLC SERPL CALC-MCNC: 96.2 MG/DL (ref 0–100)
LIPID PROFILE,FLP: ABNORMAL
LYMPHOCYTES # BLD: 1.5 K/UL (ref 0.9–3.6)
LYMPHOCYTES NFR BLD: 32 % (ref 21–52)
MCH RBC QN AUTO: 28.1 PG (ref 24–34)
MCHC RBC AUTO-ENTMCNC: 31.7 G/DL (ref 31–37)
MCV RBC AUTO: 88.5 FL (ref 74–97)
MONOCYTES # BLD: 0.5 K/UL (ref 0.05–1.2)
MONOCYTES NFR BLD: 9 % (ref 3–10)
NEUTS SEG # BLD: 2.4 K/UL (ref 1.8–8)
NEUTS SEG NFR BLD: 51 % (ref 40–73)
PLATELET # BLD AUTO: 179 K/UL (ref 135–420)
PMV BLD AUTO: 12 FL (ref 9.2–11.8)
POTASSIUM SERPL-SCNC: 4.2 MMOL/L (ref 3.5–5.5)
PROT SERPL-MCNC: 6.2 G/DL (ref 6.4–8.2)
RBC # BLD AUTO: 4.95 M/UL (ref 4.7–5.5)
SODIUM SERPL-SCNC: 141 MMOL/L (ref 136–145)
T4 FREE SERPL-MCNC: 1 NG/DL (ref 0.7–1.5)
TRIGL SERPL-MCNC: 154 MG/DL (ref ?–150)
TSH SERPL DL<=0.05 MIU/L-ACNC: 2.32 UIU/ML (ref 0.36–3.74)
VLDLC SERPL CALC-MCNC: 30.8 MG/DL
WBC # BLD AUTO: 4.8 K/UL (ref 4.6–13.2)

## 2017-11-22 PROCEDURE — 84439 ASSAY OF FREE THYROXINE: CPT | Performed by: INTERNAL MEDICINE

## 2017-11-22 PROCEDURE — 80061 LIPID PANEL: CPT | Performed by: INTERNAL MEDICINE

## 2017-11-22 PROCEDURE — 84443 ASSAY THYROID STIM HORMONE: CPT | Performed by: INTERNAL MEDICINE

## 2017-11-22 PROCEDURE — 80053 COMPREHEN METABOLIC PANEL: CPT | Performed by: INTERNAL MEDICINE

## 2017-11-22 PROCEDURE — 85025 COMPLETE CBC W/AUTO DIFF WBC: CPT | Performed by: INTERNAL MEDICINE

## 2017-11-22 PROCEDURE — 36415 COLL VENOUS BLD VENIPUNCTURE: CPT | Performed by: INTERNAL MEDICINE

## 2017-11-22 NOTE — LETTER
12/1/2017 9:13 AM 
 
Mr. Cm Chambers 
[address] [facility] [address] Dear Cm Chambers: 
 
Please find your most recent results below. Resulted Orders CBC WITH AUTOMATED DIFF Result Value Ref Range WBC 4.8 4.6 - 13.2 K/uL  
 RBC 4.95 4.70 - 5.50 M/uL  
 HGB 13.9 13.0 - 16.0 g/dL HCT 43.8 36.0 - 48.0 % MCV 88.5 74.0 - 97.0 FL  
 MCH 28.1 24.0 - 34.0 PG  
 MCHC 31.7 31.0 - 37.0 g/dL  
 RDW 14.9 (H) 11.6 - 14.5 % PLATELET 624 559 - 896 K/uL MPV 12.0 (H) 9.2 - 11.8 FL  
 NEUTROPHILS 51 40 - 73 % LYMPHOCYTES 32 21 - 52 % MONOCYTES 9 3 - 10 % EOSINOPHILS 7 (H) 0 - 5 % BASOPHILS 1 0 - 2 %  
 ABS. NEUTROPHILS 2.4 1.8 - 8.0 K/UL  
 ABS. LYMPHOCYTES 1.5 0.9 - 3.6 K/UL  
 ABS. MONOCYTES 0.5 0.05 - 1.2 K/UL  
 ABS. EOSINOPHILS 0.4 0.0 - 0.4 K/UL  
 ABS. BASOPHILS 0.0 0.0 - 0.06 K/UL  
 DF AUTOMATED METABOLIC PANEL, COMPREHENSIVE Result Value Ref Range Sodium 141 136 - 145 mmol/L Potassium 4.2 3.5 - 5.5 mmol/L Chloride 107 100 - 108 mmol/L  
 CO2 27 21 - 32 mmol/L Anion gap 7 3.0 - 18 mmol/L Glucose 95 74 - 99 mg/dL BUN 21 (H) 7.0 - 18 MG/DL Creatinine 1.61 (H) 0.6 - 1.3 MG/DL  
 BUN/Creatinine ratio 13 12 - 20 GFR est AA 50 (L) >60 ml/min/1.73m2 GFR est non-AA 42 (L) >60 ml/min/1.73m2 Comment:  
   (NOTE) Estimated GFR is calculated using the Modification of Diet in Renal  
Disease (MDRD) Study equation, reported for both  [facility]) and non- Americans (GFRNA), and normalized to 1.73m2  
body surface area. The physician must decide which value applies to  
the patient. The MDRD study equation should only be used in  
individuals age 25 or older. It has not been validated for the  
following: pregnant women, patients with serious comorbid conditions,  
or on certain medications, or persons with extremes of body size,  
muscle mass, or nutritional status.  
  
 Calcium 8.9 8.5 - 10.1 MG/DL  
 Bilirubin, total 0.4 0.2 - 1.0 MG/DL  
 ALT (SGPT) 28 16 - 61 U/L  
 AST (SGOT) 17 15 - 37 U/L Alk. phosphatase 49 45 - 117 U/L Protein, total 6.2 (L) 6.4 - 8.2 g/dL Albumin 3.3 (L) 3.4 - 5.0 g/dL Globulin 2.9 2.0 - 4.0 g/dL A-G Ratio 1.1 0.8 - 1.7 LIPID PANEL Result Value Ref Range LIPID PROFILE Cholesterol, total 172 <200 MG/DL Triglyceride 154 (H) <150 MG/DL Comment:  
   The drugs N-acetylcysteine (NAC) and 
Metamiszole have been found to cause falsely 
low results in this chemical assay. Please 
be sure to submit blood samples obtained BEFORE administration of either of these 
drugs to assure correct results. HDL Cholesterol 45 40 - 60 MG/DL  
 LDL, calculated 96.2 0 - 100 MG/DL VLDL, calculated 30.8 MG/DL  
 CHOL/HDL Ratio 3.8 0 - 5.0    
T4, FREE Result Value Ref Range T4, Free 1.0 0.7 - 1.5 NG/DL  
TSH 3RD GENERATION Result Value Ref Range TSH 2.32 0.36 - 3.74 uIU/mL RECOMMENDATIONS: 
all good except he needs more fluids to keep his kidneys healthy Please call me if you have any questions: [phone] Sincerely, [facility] LAB OUTREACH INSURANCE

## 2017-11-27 ENCOUNTER — TELEPHONE (OUTPATIENT)
Dept: FAMILY MEDICINE CLINIC | Age: 77
End: 2017-11-27

## 2017-11-27 NOTE — TELEPHONE ENCOUNTER
Pt returned call about lab results. Read note per Dr. Octavio Berumen. Pt expressed understanding and had no further questions.

## 2017-12-05 ENCOUNTER — TELEPHONE (OUTPATIENT)
Dept: FAMILY MEDICINE CLINIC | Age: 77
End: 2017-12-05

## 2017-12-05 NOTE — TELEPHONE ENCOUNTER
Pt came in office stating he would like a phone call to go over his lab results in detail. Please contact back when possible.

## 2017-12-11 ENCOUNTER — OFFICE VISIT (OUTPATIENT)
Dept: FAMILY MEDICINE CLINIC | Age: 77
End: 2017-12-11

## 2017-12-11 VITALS
HEIGHT: 70 IN | OXYGEN SATURATION: 97 % | WEIGHT: 207 LBS | RESPIRATION RATE: 20 BRPM | HEART RATE: 59 BPM | TEMPERATURE: 96.7 F | BODY MASS INDEX: 29.63 KG/M2 | SYSTOLIC BLOOD PRESSURE: 174 MMHG | DIASTOLIC BLOOD PRESSURE: 68 MMHG

## 2017-12-11 DIAGNOSIS — Z00.00 ROUTINE GENERAL MEDICAL EXAMINATION AT A HEALTH CARE FACILITY: ICD-10-CM

## 2017-12-11 DIAGNOSIS — K50.819 CROHN'S DISEASE OF SMALL AND LARGE INTESTINES WITH COMPLICATION (HCC): ICD-10-CM

## 2017-12-11 DIAGNOSIS — I10 ESSENTIAL HYPERTENSION: ICD-10-CM

## 2017-12-11 DIAGNOSIS — N40.1 BENIGN PROSTATIC HYPERPLASIA WITH LOWER URINARY TRACT SYMPTOMS, SYMPTOM DETAILS UNSPECIFIED: ICD-10-CM

## 2017-12-11 DIAGNOSIS — J45.40 MODERATE PERSISTENT ASTHMA WITHOUT COMPLICATION: ICD-10-CM

## 2017-12-11 DIAGNOSIS — N52.9 ED (ERECTILE DYSFUNCTION) OF ORGANIC ORIGIN: ICD-10-CM

## 2017-12-11 DIAGNOSIS — C61 PROSTATE CANCER (HCC): Primary | ICD-10-CM

## 2017-12-11 RX ORDER — LOSARTAN POTASSIUM 100 MG/1
100 TABLET ORAL DAILY
Qty: 90 TAB | Refills: 3 | Status: SHIPPED | OUTPATIENT
Start: 2017-12-11 | End: 2018-03-05 | Stop reason: SINTOL

## 2017-12-11 NOTE — PROGRESS NOTES
Natalia Rosales is a 76 y.o. male and presents for annual Medicare Wellness Visit. Problem List: Reviewed with patient and discussed risk factors. 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    Ganglion of joint M67.40    Gross hematuria R31.0    Benign prostatic hyperplasia with lower urinary tract symptoms N40.1       Current medical providers:  Patient Care Team:  Donell Diamond MD as PCP - General (Internal Medicine)  Yuriy Heaton MD as Consulting Provider (Urology)  Patricia Cunningham RN as Ambulatory Care Navigator  Joanne Lind MD as Consulting Provider (Neurosurgery)  Leopold Nickels, MD as Consulting Provider (Urology)  Laura Pandya LPN as Ambulatory Care Navigator    PSH: Reviewed with patient  Past Surgical History:   Procedure Laterality Date    ENDOSCOPY, COLON, DIAGNOSTIC      HX HERNIA REPAIR      x3    HX OTHER SURGICAL      perianal abscess    HX OTHER SURGICAL      Foot    HX OTHER SURGICAL      cataract Surgery    HX OTHER SURGICAL  7/30/15    Microdiskectomy L5-L4    HX OTHER SURGICAL  10/22/2015    Microdiskectomy L5-L4        SH: Reviewed with patient  Social History   Substance Use Topics    Smoking status: Former Smoker     Packs/day: 1.00     Quit date: 12/25/1998    Smokeless tobacco: Never Used    Alcohol use 0.5 - 1.0 oz/week     1 - 2 Standard drinks or equivalent per week       FH: Reviewed with patient  Family History   Problem Relation Age of Onset    Heart Disease Father     Hypertension Father     Heart Failure Father        Medications/Allergies: Reviewed with patient  Current Outpatient Prescriptions on File Prior to Visit   Medication Sig Dispense Refill    amLODIPine (NORVASC) 5 mg tablet Take 1 Tab by mouth two (2) times a day. 90 Tab 3    Lactobac 40-Bifido 3-S.thermop (PROBIOTIC) 100 billion cell cap Take 1 Tab by mouth daily.       fluticasone-salmeterol (ADVAIR) 250-50 mcg/dose diskus inhaler Take 1 Puff by inhalation daily.  ALBUTEROL IN Take 1-2 Puffs by inhalation every four (4) hours as needed for Wheezing.  diazepam (VALIUM) 5 mg tablet 5 mg.  Cetirizine 10 mg cap Take  by mouth.  lactobacillus acidophilus (BACID) cap Take 2 Caps by mouth two (2) times a day.  PSYLLIUM SEED, WITH SUGAR, (METAMUCIL PO) Take  by mouth.  finasteride (PROSCAR) 5 mg tablet Take 1 Tab by mouth daily. 90 Tab 3    budesonide (ENTOCORT EC) 3 mg capsule Take 6 mg by mouth every morning.  cyanocobalamin (VITAMIN B12) 1,000 mcg/mL injection 1,000 mcg by IntraMUSCular route once.  lisinopril (PRINIVIL, ZESTRIL) 40 mg tablet Take 40 mg by mouth daily.  ranitidine (ZANTAC) 150 mg tablet Take 150 mg by mouth two (2) times a day.  gabapentin (NEURONTIN) 100 mg capsule Take  by mouth three (3) times daily.  tiotropium (SPIRIVA WITH HANDIHALER) 18 mcg inhalation capsule Take 1 Cap by inhalation daily.  fluticasone (FLONASE) 50 mcg/Actuation nasal spray nightly.  MONTELUKAST SODIUM (SINGULAIR PO) Take  by mouth.  NORTRIPTYLINE HCL (PAMELOR PO) Take  by mouth nightly as needed.  CALCIUM CARBONATE/VITAMIN D3 (CALTRATE 600 + D PO) Take  by mouth.  FENOFIBRATE NANOCRYSTALLIZED (TRICOR PO) Take  by mouth.  albuterol (PROVENTIL VENTOLIN) 2.5 mg /3 mL (0.083 %) nebulizer solution by Nebulization route once.  fluticasone (FLOVENT DISKUS) 100 mcg/Actuation DsDv Take  by inhalation.  DOCUSATE CALCIUM (SURFAK PO) Take  by mouth.  latanoprost (XALATAN) 0.005 % ophthalmic solution Administer 1 Drop to both eyes nightly. No current facility-administered medications on file prior to visit. No Known Allergies    Objective: There were no vitals taken for this visit. There is no height or weight on file to calculate BMI.     Assessment of cognitive impairment: Alert and oriented x 4    Depression Screen:   PHQ over the last two weeks 6/19/2017   Little interest or pleasure in doing things Not at all   Feeling down, depressed or hopeless Not at all   Total Score PHQ 2 0       Fall Risk Assessment:    Fall Risk Assessment, last 12 mths 6/19/2017   Able to walk? Yes   Fall in past 12 months? No       Functional Ability:   Does the patient exhibit a steady gait? Pending  Agustin 15,2018 yes   How long did it take the patient to get up and walk from a sitting position? 2 sec   Is the patient self reliant?  (ie can do own laundry, meals, household chores)  yes     Does the patient handle his/her own medications? yes     Does the patient handle his/her own money? yes     Is the patients home safe (ie good lighting, handrails on stairs and bath, etc.)? yes     Did you notice or did patient express any hearing difficulties? Yes hearing aides     Did you notice or did patient express any vision difficulties?   no     Were distance and reading eye charts used? yes       Advance Care Planning:   Patient was offered the opportunity to discuss advance care planning:  yes     Does patient have an Advance Directive:  No living will   If no, did you provide information on Caring Connections? yes       Plan:      No orders of the defined types were placed in this encounter. Health Maintenance   Topic Date Due    GLAUCOMA SCREENING Q2Y  03/20/2017    MEDICARE YEARLY EXAM  07/20/2017    Influenza Age 5 to Adult  08/01/2017    DTaP/Tdap/Td series (2 - Td) 07/19/2026    ZOSTER VACCINE AGE 60>  Completed    Pneumococcal 65+ High/Highest Risk  Addressed       *Patient verbalized understanding and agreement with the plan. A copy of the After Visit Summary with personalized health plan was given to the patient today.       .   Medicare Part B Preventive Services Limitations Recommendation Scheduled   Bone Mass Measurement  (age 72 & older, biennial) Requires diagnosis related to osteoporosis or estrogen deficiency. Biennial benefit unless patient has history of long-term glucocorticoid tx or baseline is needed because initial test was by other method Every 2 years or more frequently if medically necessary. Done:  N/A         Cardiovascular Screening Blood Tests (every 5 years)  Total cholesterol, HDL, Triglycerides Order as a panel if possible Every 5 years. Done:  11/22/2017         Colorectal Cancer Screening  -Fecal occult blood test (annual)  -Flexible sigmoidoscopy (5y)  -Screening colonoscopy (10y)  -Barium Enema Colorectal Cancer Screening (Ages 54-65 yrs old)  For all patients 48 and older:  -Annual fecal occult blood test or  colonoscopy every 10 years or  -Flexible sigmoidoscopy every 5 years or  -lower endoscopy to be performed more frequently, if advised by GI. Done:  Mar. 2017  [facility] Dr. Scott Ramos         Counseling to Prevent Tobacco Use (up to 8 sessions per year)  - Counseling greater than 3 and up to 10 minutes  - Counseling greater than 10 minutes Patients must be asymptomatic of tobacco-related conditions to receive as preventive service Two cessation counseling attempts (up to 8 counseling sessions) per year. N/A   Diabetes Screening Tests (at least every 3 years, Medicare covers annually or at 6-month intervals for prediabetic patients)    Fasting blood sugar (FBS) or glucose tolerance test (GTT) Patient must be diagnosed with one of the following:  -Hypertension, Dyslipidemia, obesity, previous impaired FBS or GTT  Or any two of the following: overweight, FH of diabetes, age ? 72, history of gestational diabetes, birth of baby weighing more than 9 pounds Annually or every 6 months if previous diagnosis of elevated FBS, elevated HbA1c, or impaired GTT, or glucosuria. Done:  N/A         Diabetes Self-Management Training (DSMT) (no USPSTF recommendation) Requires referral by treating physician for patient with diabetes or renal disease.  10 hours of initial DSMT session of no less than 30 minutes each in a continuous 12-month period. 2 hours of follow-up DSMT in subsequent years. Up to 10 hours of initial training within a continuous 12 month period of subsequent years: up to 2 hours of follow-up training each year after the initial year. N/A   Glaucoma Screening (no USPSTF recommendation) Diabetes mellitus, family history, , age 48 or over,  American, age 72 or over Annually for covered beneficiaries. Done:  Feb 2016  Due feb 2018           Human Immunodeficiency Virus (HIV) Screening (annually for increased risk patients)  HIV-1 and HIV-2 by EIA, AGUSTIN, rapid antibody test, or oral mucosa transudate Patient must be at increased risk for HIV infection per USPSTF guidelines or pregnant. Tests covered annually for patients at increased risk. Pregnant patients may receive up to 3 test during pregnancy. Annually for beneficiaries at increased risk, including anyone who asks for the test. N/A   Medical Nutrition Therapy (MNT) (for diabetes or renal disease not recommended schedule) Requires referral by treating physician for patient with diabetes or renal disease. Can be provided in same year as diabetes self-management training (DSMT), and CMS recommends medical nutrition therapy take place after DSMT. Up to 3 hours for initial year and 2 hours in subsequent years. First year: 3 hours of one-on-one counseling or subsequent years: 2 hours. N/A   Prostate Cancer Screening (annually up to age 76)  - Digital rectal exam (PAUL)  - Prostate specific antigen (PSA) Annually (age 48 or over), PAUL not paid separately when covered E/M service is provided on same date Once every 12 months for patients age older than 48years of age includes: digital rectal exam and/or prostate specific antigen test. Done:  6/15/2016  0.08         Seasonal Influenza Vaccination (annually)  Once per fall or winter season.  Done:  Oct/2017 inna perales Pneumococcal Vaccination (once after 72)  Once after age 72 and if more than 5 years since last vaccination and/or uncertainty of vaccine status. Pneumococcal:  3 or 4 years   Prevnar 13:  2016   Hepatitis B Vaccinations (if medium/high risk) Medium/high risk factors:  End-stage renal disease,  Hemophiliacs who received Factor VIII or IX concentrates, Clients of institutions for the mentally retarded, Persons who live in the same house as a HepB virus carrier, Homosexual men, Illicit injectable drug abusers. Schedule course of vaccines if patient not previously vaccinated  *additional shots if medically necessary. N/A   Screening Mammography (biennial age 54-69)? Annually (age 36 or over) Age 28 through 44: one baseline or aged 36 and older: annually. Done:  N/A         Screening Pap Tests and Pelvic Examination (up to age 79 and after 79 if unknown history or abnormal study last 10 years) Every 24 months except high risk Annually if at neva risk for developing cervical or vaginal cancer, or childbearing, age with abnormal Pap test within past 3 years or every 2 years for women at normal risk. Done:  N/A           Ultrasound Screening for Abdominal Aortic Aneurysm (AAA) (once) Patient must be referred through IPPE and not have had a screening for abdominal aortic aneurysm before under Medicare. Limited to patients who meet one of the following criteria:  - Men who are 73-68 years old and have smoked more than 100 cigarettes in their lifetime.  -Anyone with a FH of AAA  -Anyone recommended for screening by USPSTF Once in a lifetime.  N/A

## 2017-12-11 NOTE — MR AVS SNAPSHOT
Visit Information Date & Time Provider Department Dept. Phone Encounter #  
 12/11/2017  3:15 PM Luna Moss, [facility] [phone] [id number] Follow-up Instructions Return in about 3 months (around 3/11/2018). Follow-up and Disposition History Your Appointments 12/14/2017 11:20 AM  
Nurse Visit with Marcela Dixon [facility] NURSE [facility] ([address]) Appt Note: tech psa  
 [address] [place] [address] [id number]  
  
    
 6/11/2018 10:00 AM  
ESTABLISHED PATIENT with Saurabh Cortez MD  
[facility] ([address]) Appt Note: 1 year with psa  
 [address], [place] [address]  
  
   
 [address], [address] [facility] [address] Upcoming Health Maintenance Date Due  
 GLAUCOMA SCREENING Q2Y 2/1/2018 MEDICARE YEARLY EXAM 12/12/2018 DTaP/Tdap/Td series (2 - Td) 7/19/2026 Allergies as of 12/11/2017  Review Complete On: 12/11/2017 By: Luna Moss MD  
 No Known Allergies Current Immunizations  Never Reviewed Name Date Influenza Vaccine 10/24/2014 12:00 AM  
 Pneumococcal Polysaccharide (PPSV-23) 11/24/2012 12:00 AM  
  
 Not reviewed this visit You Were Diagnosed With   
  
 Codes Comments Prostate cancer [facility])    -  Primary ICD-10-CM: P73 ICD-9-CM: 077 ED (erectile dysfunction) of organic origin     ICD-10-CM: N52.9 ICD-9-CM: 607.84 Benign prostatic hyperplasia with lower urinary tract symptoms, symptom details unspecified     ICD-10-CM: N40.1 ICD-9-CM: 600.01 Essential hypertension     ICD-10-CM: I10 
ICD-9-CM: 401.9 Crohn's disease of small and large intestines with complication ([facility]     WEL-73-SB: A97.486 ICD-9-CM: 722. 2 Vitals BP Pulse Temp Resp Height(growth percentile) Weight(growth percentile) 174/68 (BP 1 Location: Right arm, BP Patient Position: Sitting) (!) 59 96.7 °F (35.9 °C) (Oral) 20 5' 10\" (1.778 m) 207 lb (93.9 kg) SpO2 BMI Smoking Status 97% 29.7 kg/m2 Former Smoker BMI and BSA Data Body Mass Index Body Surface Area  
 29.7 kg/m 2 2.15 m 2 Preferred Pharmacy Pharmacy Name Phone [address] [facility] [phone] Your Updated Medication List  
  
   
This list is accurate as of: 12/11/17  4:16 PM.  Always use your most recent med list.  
  
  
  
  
 albuterol 2.5 mg /3 mL (0.083 %) nebulizer solution Commonly known as:  PROVENTIL VENTOLIN  
by Nebulization route once. ALBUTEROL IN Take 1-2 Puffs by inhalation every four (4) hours as needed for Wheezing. amLODIPine 5 mg tablet Commonly known as:  Arlyss Deana Take 1 Tab by mouth two (2) times a day. Cetirizine 10 mg Cap Take  by mouth.  
  
 cyanocobalamin 1,000 mcg/mL injection Commonly known as:  VITAMIN B12  
1,000 mcg by IntraMUSCular route once. ENTOCORT EC 3 mg capsule Generic drug:  budesonide Take 6 mg by mouth every morning. * FLONASE 50 mcg/actuation nasal spray Generic drug:  fluticasone  
nightly. * FLOVENT DISKUS 100 mcg/actuation Dsdv Generic drug:  fluticasone Take  by inhalation. fluticasone-salmeterol 250-50 mcg/dose diskus inhaler Commonly known as:  ADVAIR Take 1 Puff by inhalation daily. Lactobacillus acidophilus Cap Commonly known as:  BACID Take 2 Caps by mouth two (2) times a day. losartan 100 mg tablet Commonly known as:  COZAAR Take 1 Tab by mouth daily. METAMUCIL PO Take  by mouth. NEURONTIN 100 mg capsule Generic drug:  gabapentin Take  by mouth three (3) times daily. PAMELOR PO Take  by mouth nightly as needed. PROBIOTIC 100 billion cell Cap Generic drug:  Lactobac 40-Bifido 3-S.thermop Take 1 Tab by mouth daily. SINGULAIR PO Take  by mouth. SPIRIVA WITH HANDIHALER 18 mcg inhalation capsule Generic drug:  tiotropium Take 1 Cap by inhalation daily. SURFAK PO Take  by mouth. TRICOR PO Take  by mouth. VALIUM 5 mg tablet Generic drug:  diazePAM  
5 mg. XALATAN 0.005 % ophthalmic solution Generic drug:  latanoprost  
Administer 1 Drop to both eyes nightly. ZANTAC 150 mg tablet Generic drug:  raNITIdine Take 150 mg by mouth two (2) times a day. * Notice: This list has 2 medication(s) that are the same as other medications prescribed for you. Read the directions carefully, and ask your doctor or other care provider to review them with you. Prescriptions Printed Refills  
 losartan (COZAAR) 100 mg tablet 3 Sig: Take 1 Tab by mouth daily. Class: Print Route: Oral  
  
We Performed the Following REFERRAL TO [facility] PROGRAMS [BCY247 Custom] Comments:  
 patient has been referred into the [address] [place] [address] Programs indicated above. He is currently being managed for the following chronic conditions: has Prostate cancer [facility]), ED (erectile dysfunction) of organic origin, Benign prostatic hyperplasia with lower urinary tract symptoms, Essential hypertension, and Crohn's disease of small and large intestines with complication ([facility] on his problem list.  
  
Follow-up Instructions Return in about 3 months (around 3/11/2018). Referral Information Referral ID Referred By Referred To  
  
 [id number] Alvis Lesches Not Available Visits Status Start Date End Date 1 New Request 12/11/17 12/11/18 If your referral has a status of pending review or denied, additional information will be sent to support the outcome of this decision. Patient Instructions Montrell Callejas Schedule of Personalized Health Plan (Provide Copy to Patient) The best way to stay healthy is to live a healthy lifestyle.  A healthy lifestyle includes regular exercise, eating a well-balanced diet, keeping a healthy weight and not smoking. Regular physical exams and screening tests are another important way to take care of yourself. Preventive exams provided by health care providers can find health problems early when treatment works best and can keep you from getting certain diseases or illnesses. Preventive services include exams, lab tests, screenings, shots, monitoring and information to help you take care of your own health. All people over 65 should have a pneumonia shot. Pneumonia shots are usually only needed once in a lifetime unless your doctor decides differently. All people over 65 should have a yearly flu shot. People over 65 are at medium to high risk for Hepatitis B. Three shots are needed for complete protection. In addition to your physical exam, some screening tests are recommended: 
 
Bone mass measurement (dexa scan) is recommended every two years if you have certain risk factors, such as personal history of vertebral fracture or chronic steroid medication use Diabetes Mellitus screening is recommended every year. Glaucoma is an eye disease caused by high pressure in the eye. An eye exam is recommended every year. Cardiovascular screening tests that check your cholesterol and other blood fat (lipid) levels are recommended every five years. Colorectal Cancer screening tests help to find pre-cancerous polyps (growths in the colon) so they can be removed before they turn into cancer. Tests ordered for screening depend on your personal and family history risk factors. Screening for Prostate Cancer is recommended yearly with a digital rectal exam and/or a PSA test 
 
Here is a list of your current Health Maintenance items with a due date: 
Health Maintenance Topic Date Due  GLAUCOMA SCREENING Q2Y  03/20/2017  Influenza Age 5 to Adult  08/01/2017  MEDICARE YEARLY EXAM  12/12/2018  DTaP/Tdap/Td series (2 - Td) 07/19/2026  ZOSTER VACCINE AGE 60>  Completed  Pneumococcal 65+ High/Highest Risk  Addressed Advance Care Planning: Care Instructions Your Care Instructions It can be hard to live with an illness that cannot be cured. But if your health is getting worse, you may want to make decisions about end-of-life care. Planning for the end of your life does not mean that you are giving up. It is a way to make sure that your wishes are met. Clearly stating your wishes can make it easier for your loved ones. Making plans while you are still able may also ease your mind and make your final days less stressful and more meaningful. Follow-up care is a key part of your treatment and safety. Be sure to make and go to all appointments, and call your doctor if you are having problems. It's also a good idea to know your test results and keep a list of the medicines you take. What can you do to plan for the end of life? · You can bring these issues up with your doctor. You do not need to wait until your doctor starts the conversation. You might start with \"I would not be willing to live with . Geeta Riser Geeta Riser Geeta Riser \" When you complete this sentence it helps your doctor understand your wishes. · Talk openly and honestly with your doctor. This is the best way to understand the decisions you will need to make as your health changes. Know that you can always change your mind. · Ask your doctor about commonly used life-support measures. These include tube feedings, breathing machines, and fluids given through a vein (IV). Understanding these treatments will help you decide whether you want them. · You may choose to have these life-supporting treatments for a limited time. This allows a trial period to see whether they will help you. You may also decide that you want your doctor to take only certain measures to keep you alive.  It is important to spell out these conditions so that your doctor and family understand them. · Talk to your doctor about how long you are likely to live. He or she may be able to give you an idea of what usually happens with your specific illness. · Think about preparing papers that state your wishes. This way there will not be any confusion about what you want. You can change your instructions at any time. Which papers should you prepare? Advance directives are legal papers that tell doctors how you want to be cared for at the end of your life. You do not need a  to write these papers. Ask your doctor or your [facility] department for information on how to write your advance directives. They may have the forms for each of these types of papers. Make sure your doctor has a copy of these on file, and give a copy to a family member or close friend. · Consider a do-not-resuscitate order (DNR). This order asks that no extra treatments be done if your heart stops or you stop breathing. Extra treatments may include cardiopulmonary resuscitation (CPR), electrical shock to restart your heart, or a machine to breathe for you. If you decide to have a DNR order, ask your doctor to explain and write it. Place the order in your home where everyone can easily see it. · Consider a living will. A living will explains your wishes about life support and other treatments at the end of your life if you become unable to speak for yourself. Living sears tell doctors to use or not use treatments that would keep you alive. You must have one or two witnesses or a notary present when you sign this form. · Consider a durable power of  for health care. This allows you to name a person to make decisions about your care if you are not able to. Most people ask a close friend or family member. Talk to this person about the kinds of treatments you want and those that you do not want. Make sure this person understands your wishes. These legal papers are simple to change. Tell your doctor what you want to change, and ask him or her to make a note in your medical file. Give your family updated copies of the papers. Where can you learn more? Go to http://alex-dusty.info/. Enter P184 in the search box to learn more about \"Advance Care Planning: Care Instructions. \" Current as of: September 24, 2016 Content Version: 11.4 © 7786-2374 [employer]. Care instructions adapted under license by [employer] (which disclaims liability or warranty for this information). If you have questions about a medical condition or this instruction, always ask your healthcare professional. [place] [address] any warranty or liability for your use of this information. [address] What is a living will? A living will is a legal form you use to write down the kind of care you want at the end of your life. It is used by the health professionals who will treat you if you aren't able to decide for yourself. If you put your wishes in writing, your loved ones and others will know what kind of care you want. They won't need to guess. This can ease your mind and be helpful to others. A living will is not the same as an estate or property will. An estate will explains what you want to happen with your money and property after you die. Is a living will a legal document? A living will is a legal document. Each state has its own laws about living sears. If you move to another state, make sure that your living will is legal in the state where you now live. Or you might use a universal form that has been approved by many states. This kind of form can sometimes be completed and stored online. Your electronic copy will then be available wherever you have a connection to the Internet. In most cases, doctors will respect your wishes even if you have a form from a different state. · You don't need an  to complete a living will. But legal advice can be helpful if your state's laws are unclear, your health history is complicated, or your family can't agree on what should be in your living will. · You can change your living will at any time. Some people find that their wishes about end-of-life care change as their health changes. · In addition to making a living will, think about completing a medical power of  form. This form lets you name the person you want to make end-of-life treatment decisions for you (your \"health care agent\") if you're not able to. Many hospitals and nursing homes will give you the forms you need to complete a living will and a medical power of . · Your living will is used only if you can't make or communicate decisions for yourself anymore. If you become able to make decisions again, you can accept or refuse any treatment, no matter what you wrote in your living will. · Your state may offer an online registry. This is a place where you can store your living will online so the doctors and nurses who need to treat you can find it right away. What should you think about when creating a living will? Talk about your end-of-life wishes with your family members and your doctor. Let them know what you want. That way the people making decisions for you won't be surprised by your choices. Think about these questions as you make your living will: · Do you know enough about life support methods that might be used? If not, talk to your doctor so you know what might be done if you can't breathe on your own, your heart stops, or you're unable to swallow. · What things would you still want to be able to do after you receive life-support methods? Would you want to be able to walk? To speak? To eat on your own? To live without the help of machines? · If you have a choice, where do you want to be cared for? In your home? At a hospital or nursing home? · Do you want certain Scientologist practices performed if you become very ill? · If you have a choice at the end of your life, where would you prefer to die? At home? In a hospital or nursing home? Somewhere else? · Would you prefer to be buried or cremated? · Do you want your organs to be donated after you die? What should you do with your living will? · Make sure that your family members and your health care agent have copies of your living will. · Give your doctor a copy of your living will to keep in your medical record. If you have more than one doctor, make sure that each one has a copy. · You may want to put a copy of your living will where it can be easily found. Where can you learn more? Go to http://alex-dusty.info/. Enter K305 in the search box to learn more about \"Learning About Living Naima. \" Current as of: September 24, 2016 Content Version: 11.4 © 1744-0278 Pontaba. Care instructions adapted under license by [employer] (which disclaims liability or warranty for this information). If you have questions about a medical condition or this instruction, always ask your healthcare professional. [place] [address] any warranty or liability for your use of this information. Advance Directives: Care Instructions Your Care Instructions An advance directive is a legal way to state your wishes at the end of your life. It tells your family and your doctor what to do if you can no longer say what you want. There are two main types of advance directives. You can change them any time that your wishes change. · A living will tells your family and your doctor your wishes about life support and other treatment. · A durable power of  for health care lets you name a person to make treatment decisions for you when you can't speak for yourself. This person is called a health care agent. If you do not have an advance directive, decisions about your medical care may be made by a doctor or a  who doesn't know you. It may help to think of an advance directive as a gift to the people who care for you. If you have one, they won't have to make tough decisions by themselves. Follow-up care is a key part of your treatment and safety. Be sure to make and go to all appointments, and call your doctor if you are having problems. It's also a good idea to know your test results and keep a list of the medicines you take. How can you care for yourself at home? · Discuss your wishes with your loved ones and your doctor. This way, there are no surprises. · Many states have a unique form. Or you might use a universal form that has been approved by many states. This kind of form can sometimes be completed and stored online. Your electronic copy will then be available wherever you have a connection to the Internet. In most cases, doctors will respect your wishes even if you have a form from a different state. · You don't need a  to do an advance directive. But you may want to get legal advice. · Think about these questions when you prepare an advance directive: ¨ Who do you want to make decisions about your medical care if you are not able to? Many people choose a family member or close friend. ¨ Do you know enough about life support methods that might be used? If not, talk to your doctor so you understand. ¨ What are you most afraid of that might happen? You might be afraid of having pain, losing your independence, or being kept alive by machines. ¨ Where would you prefer to die? Choices include your home, a hospital, or a nursing home. ¨ Would you like to have information about hospice care to support you and your family? ¨ Do you want to donate organs when you die? ¨ Do you want certain Jewish practices performed before you die? If so, put your wishes in the advance directive. · Read your advance directive every year, and make changes as needed. When should you call for help? Be sure to contact your doctor if you have any questions. Where can you learn more? Go to http://alex-dusty.info/. Enter R264 in the search box to learn more about \"Advance Directives: Care Instructions. \" Current as of: September 24, 2016 Content Version: 11.4 © 0798-7037 [employer]task. Care instructions adapted under license by [employer]X (which disclaims liability or warranty for this information). If you have questions about a medical condition or this instruction, always ask your healthcare professional. [address] any warranty or liability for your use of this information. Patient Instructions History Introducing [facility] HE[facility] SERVICES! Dear Soniya Triplett: Thank you for requesting a [employer] account. Our records indicate that you have previously registered for a [employer] account but its currently inactive. Please call our [employer] support line at [phone]. Additional Information If you have questions, please visit the Frequently Asked Questions section of the [employer] website at [email or web address] "[email or web address][email or web address] Remember, [employer] is NOT to be used for urgent needs. For medical emergencies, dial 911. Now available from your iPhone and Android! Please provide this summary of care documentation to your next provider. Your primary care clinician is listed as Triston Stone. If you have any questions after today's visit, please call [phone].

## 2017-12-11 NOTE — PROGRESS NOTES
HISTORY OF PRESENT ILLNESS  Gabriel Adam is a 76 y.o. male. HPI  hbp stable  Crohn's disease stable  bph stable  Review of Systems   All other systems reviewed and are negative. Past Medical History:   Diagnosis Date    Asthma     Crohn disease ([address]     Essential hypertension     Gross hematuria     High cholesterol     History of positive PPD     Hypertrophy of prostate with urinary obstruction and other lower urinary tract symptoms (LUTS)     Impotence of organic origin     Prostate CA (HCC)     [id number], No, Mx, Gl3+4    Prostatitis, unspecified     Urinary frequency        Current Outpatient Prescriptions:     amLODIPine (NORVASC) 5 mg tablet, Take 1 Tab by mouth two (2) times a day., Disp: 90 Tab, Rfl: 3    Lactobac 40-Bifido 3-S.thermop (PROBIOTIC) 100 billion cell cap, Take 1 Tab by mouth daily. , Disp: , Rfl:     fluticasone-salmeterol (ADVAIR) 250-50 mcg/dose diskus inhaler, Take 1 Puff by inhalation daily. , Disp: , Rfl:     ALBUTEROL IN, Take 1-2 Puffs by inhalation every four (4) hours as needed for Wheezing., Disp: , Rfl:     diazepam (VALIUM) 5 mg tablet, 5 mg., Disp: , Rfl:     Cetirizine 10 mg cap, Take  by mouth., Disp: , Rfl:     lactobacillus acidophilus (BACID) cap, Take 2 Caps by mouth two (2) times a day., Disp: , Rfl:     PSYLLIUM SEED, WITH SUGAR, (METAMUCIL PO), Take  by mouth., Disp: , Rfl:     budesonide (ENTOCORT EC) 3 mg capsule, Take 6 mg by mouth every morning.  , Disp: , Rfl:     cyanocobalamin (VITAMIN B12) 1,000 mcg/mL injection, 1,000 mcg by IntraMUSCular route once.  , Disp: , Rfl:     lisinopril (PRINIVIL, ZESTRIL) 40 mg tablet, Take 40 mg by mouth daily. , Disp: , Rfl:     ranitidine (ZANTAC) 150 mg tablet, Take 150 mg by mouth two (2) times a day.  , Disp: , Rfl:     gabapentin (NEURONTIN) 100 mg capsule, Take  by mouth three (3) times daily.   , Disp: , Rfl:     tiotropium (SPIRIVA WITH HANDIHALER) 18 mcg inhalation capsule, Take 1 Cap by inhalation daily.  , Disp: , Rfl:     fluticasone (FLONASE) 50 mcg/Actuation nasal spray, nightly.  , Disp: , Rfl:     MONTELUKAST SODIUM (SINGULAIR PO), Take  by mouth.  , Disp: , Rfl:     NORTRIPTYLINE HCL (PAMELOR PO), Take  by mouth nightly as needed. , Disp: , Rfl:     FENOFIBRATE NANOCRYSTALLIZED (TRICOR PO), Take  by mouth.  , Disp: , Rfl:     albuterol (PROVENTIL VENTOLIN) 2.5 mg /3 mL (0.083 %) nebulizer solution, by Nebulization route once.  , Disp: , Rfl:     fluticasone (FLOVENT DISKUS) 100 mcg/Actuation DsDv, Take  by inhalation. , Disp: , Rfl:     DOCUSATE CALCIUM (SURFAK PO), Take  by mouth.  , Disp: , Rfl:     latanoprost (XALATAN) 0.005 % ophthalmic solution, Administer 1 Drop to both eyes nightly.  , Disp: , Rfl:   Visit Vitals    /68 (BP 1 Location: Right arm, BP Patient Position: Sitting)    Pulse (!) 59    Temp 96.7 °F (35.9 °C) (Oral)    Resp 20    Ht 5' 10\" (1.778 m)    Wt 207 lb (93.9 kg)    SpO2 97%    BMI 29.7 kg/m2         Physical Exam   Constitutional: He appears well-developed and well-nourished. No distress. Musculoskeletal: Normal range of motion. He exhibits no edema, tenderness or deformity. Skin: He is not diaphoretic. Vitals reviewed. reviewed labs  GFR 42, ?  ACE    ASSESSMENT and PLAN  hbp   Plan  Switch to losartan 100 mg  Recheck in 3 months

## 2017-12-11 NOTE — PATIENT INSTRUCTIONS
.  Schedule of Personalized Health Plan  (Provide Copy to Patient)  The best way to stay healthy is to live a healthy lifestyle. A healthy lifestyle includes regular exercise, eating a well-balanced diet, keeping a healthy weight and not smoking. Regular physical exams and screening tests are another important way to take care of yourself. Preventive exams provided by health care providers can find health problems early when treatment works best and can keep you from getting certain diseases or illnesses. Preventive services include exams, lab tests, screenings, shots, monitoring and information to help you take care of your own health. All people over 65 should have a pneumonia shot. Pneumonia shots are usually only needed once in a lifetime unless your doctor decides differently. All people over 65 should have a yearly flu shot. People over 65 are at medium to high risk for Hepatitis B. Three shots are needed for complete protection. In addition to your physical exam, some screening tests are recommended:    Bone mass measurement (dexa scan) is recommended every two years if you have certain risk factors, such as personal history of vertebral fracture or chronic steroid medication use    Diabetes Mellitus screening is recommended every year. Glaucoma is an eye disease caused by high pressure in the eye. An eye exam is recommended every year. Cardiovascular screening tests that check your cholesterol and other blood fat (lipid) levels are recommended every five years. Colorectal Cancer screening tests help to find pre-cancerous polyps (growths in the colon) so they can be removed before they turn into cancer. Tests ordered for screening depend on your personal and family history risk factors.     Screening for Prostate Cancer is recommended yearly with a digital rectal exam and/or a PSA test    Here is a list of your current Health Maintenance items with a due date:  Health Maintenance Topic Date Due    GLAUCOMA SCREENING Q2Y  03/20/2017    Influenza Age 5 to Adult  08/01/2017    MEDICARE YEARLY EXAM  12/12/2018    DTaP/Tdap/Td series (2 - Td) 07/19/2026    ZOSTER VACCINE AGE 60>  Completed    Pneumococcal 65+ High/Highest Risk  Addressed          Advance Care Planning: Care Instructions  Your Care Instructions    It can be hard to live with an illness that cannot be cured. But if your health is getting worse, you may want to make decisions about end-of-life care. Planning for the end of your life does not mean that you are giving up. It is a way to make sure that your wishes are met. Clearly stating your wishes can make it easier for your loved ones. Making plans while you are still able may also ease your mind and make your final days less stressful and more meaningful. Follow-up care is a key part of your treatment and safety. Be sure to make and go to all appointments, and call your doctor if you are having problems. It's also a good idea to know your test results and keep a list of the medicines you take. What can you do to plan for the end of life? · You can bring these issues up with your doctor. You do not need to wait until your doctor starts the conversation. You might start with \"I would not be willing to live with . Oris Orourke Oris Orourke Oris Orourke \" When you complete this sentence it helps your doctor understand your wishes. · Talk openly and honestly with your doctor. This is the best way to understand the decisions you will need to make as your health changes. Know that you can always change your mind. · Ask your doctor about commonly used life-support measures. These include tube feedings, breathing machines, and fluids given through a vein (IV). Understanding these treatments will help you decide whether you want them. · You may choose to have these life-supporting treatments for a limited time. This allows a trial period to see whether they will help you.  You may also decide that you want your doctor to take only certain measures to keep you alive. It is important to spell out these conditions so that your doctor and family understand them. · Talk to your doctor about how long you are likely to live. He or she may be able to give you an idea of what usually happens with your specific illness. · Think about preparing papers that state your wishes. This way there will not be any confusion about what you want. You can change your instructions at any time. Which papers should you prepare? Advance directives are legal papers that tell doctors how you want to be cared for at the end of your life. You do not need a  to write these papers. Ask your doctor or your [facility] department for information on how to write your advance directives. They may have the forms for each of these types of papers. Make sure your doctor has a copy of these on file, and give a copy to a family member or close friend. · Consider a do-not-resuscitate order (DNR). This order asks that no extra treatments be done if your heart stops or you stop breathing. Extra treatments may include cardiopulmonary resuscitation (CPR), electrical shock to restart your heart, or a machine to breathe for you. If you decide to have a DNR order, ask your doctor to explain and write it. Place the order in your home where everyone can easily see it. · Consider a living will. A living will explains your wishes about life support and other treatments at the end of your life if you become unable to speak for yourself. Living sears tell doctors to use or not use treatments that would keep you alive. You must have one or two witnesses or a notary present when you sign this form. · Consider a durable power of  for health care. This allows you to name a person to make decisions about your care if you are not able to. Most people ask a close friend or family member.  Talk to this person about the kinds of treatments you want and those that you do not want. Make sure this person understands your wishes. These legal papers are simple to change. Tell your doctor what you want to change, and ask him or her to make a note in your medical file. Give your family updated copies of the papers. Where can you learn more? Go to http://alex-dusty.info/. Enter P184 in the search box to learn more about \"Advance Care Planning: Care Instructions. \"  Current as of: September 24, 2016  Content Version: 11.4  © 4013-4343 [employer]. Care instructions adapted under license by [employer] (which disclaims liability or warranty for this information). If you have questions about a medical condition or this instruction, always ask your healthcare professional. [address] any warranty or liability for your use of this information. Learning About Living [place]  What is a living will? A living will is a legal form you use to write down the kind of care you want at the end of your life. It is used by the health professionals who will treat you if you aren't able to decide for yourself. If you put your wishes in writing, your loved ones and others will know what kind of care you want. They won't need to guess. This can ease your mind and be helpful to others. A living will is not the same as an estate or property will. An estate will explains what you want to happen with your money and property after you die. Is a living will a legal document? A living will is a legal document. Each state has its own laws about living sears. If you move to another state, make sure that your living will is legal in the state where you now live. Or you might use a universal form that has been approved by many states. This kind of form can sometimes be completed and stored online. Your electronic copy will then be available wherever you have a connection to the Internet.  In most cases, doctors will respect your wishes even if you have a form from a different state. · You don't need an  to complete a living will. But legal advice can be helpful if your state's laws are unclear, your health history is complicated, or your family can't agree on what should be in your living will. · You can change your living will at any time. Some people find that their wishes about end-of-life care change as their health changes. · In addition to making a living will, think about completing a medical power of  form. This form lets you name the person you want to make end-of-life treatment decisions for you (your \"health care agent\") if you're not able to. Many hospitals and nursing homes will give you the forms you need to complete a living will and a medical power of . · Your living will is used only if you can't make or communicate decisions for yourself anymore. If you become able to make decisions again, you can accept or refuse any treatment, no matter what you wrote in your living will. · Your state may offer an online registry. This is a place where you can store your living will online so the doctors and nurses who need to treat you can find it right away. What should you think about when creating a living will? Talk about your end-of-life wishes with your family members and your doctor. Let them know what you want. That way the people making decisions for you won't be surprised by your choices. Think about these questions as you make your living will:  · Do you know enough about life support methods that might be used? If not, talk to your doctor so you know what might be done if you can't breathe on your own, your heart stops, or you're unable to swallow. · What things would you still want to be able to do after you receive life-support methods? Would you want to be able to walk? To speak? To eat on your own? To live without the help of machines?   · If you have a choice, where do you want to be cared for? In your home? At a hospital or nursing home? · Do you want certain Taoism practices performed if you become very ill? · If you have a choice at the end of your life, where would you prefer to die? At home? In a hospital or nursing home? Somewhere else? · Would you prefer to be buried or cremated? · Do you want your organs to be donated after you die? What should you do with your living will? · Make sure that your family members and your health care agent have copies of your living will. · Give your doctor a copy of your living will to keep in your medical record. If you have more than one doctor, make sure that each one has a copy. · You may want to put a copy of your living will where it can be easily found. Where can you learn more? Go to http://alex-dusty.info/. Enter H258 in the search box to learn more about \"Learning About Living [place]. \"  Current as of: September 24, 2016  Content Version: 11.4  © 5386-1530 [employer]. Care instructions adapted under license by [employer] (which disclaims liability or warranty for this information). If you have questions about a medical condition or this instruction, always ask your healthcare professional. [address] any warranty or liability for your use of this information. Advance Directives: Care Instructions  Your Care Instructions  An advance directive is a legal way to state your wishes at the end of your life. It tells your family and your doctor what to do if you can no longer say what you want. There are two main types of advance directives. You can change them any time that your wishes change. · A living will tells your family and your doctor your wishes about life support and other treatment. · A durable power of  for health care lets you name a person to make treatment decisions for you when you can't speak for yourself.  This person is called a health care agent. If you do not have an advance directive, decisions about your medical care may be made by a doctor or a  who doesn't know you. It may help to think of an advance directive as a gift to the people who care for you. If you have one, they won't have to make tough decisions by themselves. Follow-up care is a key part of your treatment and safety. Be sure to make and go to all appointments, and call your doctor if you are having problems. It's also a good idea to know your test results and keep a list of the medicines you take. How can you care for yourself at home? · Discuss your wishes with your loved ones and your doctor. This way, there are no surprises. · Many states have a unique form. Or you might use a universal form that has been approved by many states. This kind of form can sometimes be completed and stored online. Your electronic copy will then be available wherever you have a connection to the Internet. In most cases, doctors will respect your wishes even if you have a form from a different state. · You don't need a  to do an advance directive. But you may want to get legal advice. · Think about these questions when you prepare an advance directive:  ¨ Who do you want to make decisions about your medical care if you are not able to? Many people choose a family member or close friend. ¨ Do you know enough about life support methods that might be used? If not, talk to your doctor so you understand. ¨ What are you most afraid of that might happen? You might be afraid of having pain, losing your independence, or being kept alive by machines. ¨ Where would you prefer to die? Choices include your home, a hospital, or a nursing home. ¨ Would you like to have information about hospice care to support you and your family? ¨ Do you want to donate organs when you die? ¨ Do you want certain Yazdanism practices performed before you die?  If so, put your wishes in the advance directive. · Read your advance directive every year, and make changes as needed. When should you call for help? Be sure to contact your doctor if you have any questions. Where can you learn more? Go to http://alex-dusty.info/. Enter R264 in the search box to learn more about \"Advance Directives: Care Instructions. \"  Current as of: September 24, 2016  Content Version: 11.4  © 8665-4053 [employer]. Care instructions adapted under license by [employer] (which disclaims liability or warranty for this information). If you have questions about a medical condition or this instruction, always ask your healthcare professional. [address] any warranty or liability for your use of this information.

## 2017-12-11 NOTE — PROGRESS NOTES
Luis Velez is a 76 y.o. male (: 1940) presenting to address:    Chief Complaint   Patient presents with    Annual Wellness Visit     pain scale 0/10       Vitals:    17 1521   BP: 174/68   Pulse: (!) 59   Resp: 20   Temp: 96.7 °F (35.9 °C)   TempSrc: Oral   SpO2: 97%   Weight: 207 lb (93.9 kg)   Height: 5' 10\" (1.778 m)   PainSc:   0 - No pain       Hearing/Vision:   No exam data present    Learning Assessment:     Learning Assessment 2015   PRIMARY LEARNER Patient   HIGHEST LEVEL OF EDUCATION - PRIMARY LEARNER  > 4 YEARS OF COLLEGE   BARRIERS PRIMARY LEARNER NONE   CO-LEARNER CAREGIVER No   PRIMARY LANGUAGE ENGLISH   LEARNER PREFERENCE PRIMARY OTHER (COMMENT)   ANSWERED BY patient   RELATIONSHIP SELF     Depression Screening:     PHQ over the last two weeks 2017   Little interest or pleasure in doing things Not at all   Feeling down, depressed or hopeless Not at all   Total Score PHQ 2 0     Fall Risk Assessment:     Fall Risk Assessment, last 12 mths 2017   Able to walk? Yes   Fall in past 12 months? No     Abuse Screening:     Abuse Screening Questionnaire 2015   Do you ever feel afraid of your partner? N   Are you in a relationship with someone who physically or mentally threatens you? N   Is it safe for you to go home? Y     Coordination of Care Questionaire:   1. Have you been to the ER, urgent care clinic since your last visit? Hospitalized since your last visit? NO    2. Have you seen or consulted any other health care providers outside of the [address] [place] [address] since your last visit? Include any pap smears or colon screening. NO    Advanced Directive:   1. Do you have an Advanced Directive? YES    2. Would you like information on Advanced Directives?  NO

## 2018-02-21 ENCOUNTER — TELEPHONE (OUTPATIENT)
Dept: FAMILY MEDICINE CLINIC | Age: 78
End: 2018-02-21

## 2018-02-21 DIAGNOSIS — I10 ESSENTIAL HYPERTENSION: Primary | ICD-10-CM

## 2018-02-21 NOTE — TELEPHONE ENCOUNTER
Patient states she was supposed to have an order put in for labs this week. Patient would like to be called when lab orders are placed in and he can come in and have then drawn.  Please review and assist.

## 2018-02-26 ENCOUNTER — HOSPITAL ENCOUNTER (OUTPATIENT)
Dept: LAB | Age: 78
Discharge: HOME | End: 2018-02-26
Payer: MEDICARE

## 2018-02-26 DIAGNOSIS — I10 ESSENTIAL HYPERTENSION: ICD-10-CM

## 2018-02-26 LAB
ANION GAP SERPL CALC-SCNC: 5 MMOL/L (ref 3–18)
BUN SERPL-MCNC: 22 MG/DL (ref 7–18)
BUN/CREAT SERPL: 13 (ref 12–20)
CALCIUM SERPL-MCNC: 8.9 MG/DL (ref 8.5–10.1)
CHLORIDE SERPL-SCNC: 108 MMOL/L (ref 100–108)
CO2 SERPL-SCNC: 29 MMOL/L (ref 21–32)
CREAT SERPL-MCNC: 1.66 MG/DL (ref 0.6–1.3)
GLUCOSE SERPL-MCNC: 116 MG/DL (ref 74–99)
POTASSIUM SERPL-SCNC: 4.4 MMOL/L (ref 3.5–5.5)
SODIUM SERPL-SCNC: 142 MMOL/L (ref 136–145)

## 2018-02-26 PROCEDURE — 36415 COLL VENOUS BLD VENIPUNCTURE: CPT | Performed by: INTERNAL MEDICINE

## 2018-02-26 PROCEDURE — 80048 BASIC METABOLIC PNL TOTAL CA: CPT | Performed by: INTERNAL MEDICINE

## 2018-03-05 ENCOUNTER — OFFICE VISIT (OUTPATIENT)
Dept: FAMILY MEDICINE CLINIC | Age: 78
End: 2018-03-05

## 2018-03-05 VITALS
SYSTOLIC BLOOD PRESSURE: 135 MMHG | BODY MASS INDEX: 29.63 KG/M2 | WEIGHT: 207 LBS | OXYGEN SATURATION: 97 % | TEMPERATURE: 97 F | HEART RATE: 69 BPM | RESPIRATION RATE: 18 BRPM | DIASTOLIC BLOOD PRESSURE: 72 MMHG | HEIGHT: 70 IN

## 2018-03-05 DIAGNOSIS — N18.30 CKD (CHRONIC KIDNEY DISEASE) STAGE 3, GFR 30-59 ML/MIN (HCC): ICD-10-CM

## 2018-03-05 DIAGNOSIS — K50.819 CROHN'S DISEASE OF SMALL AND LARGE INTESTINES WITH COMPLICATION (HCC): ICD-10-CM

## 2018-03-05 DIAGNOSIS — C61 PROSTATE CANCER (HCC): ICD-10-CM

## 2018-03-05 DIAGNOSIS — I10 ESSENTIAL HYPERTENSION: Primary | ICD-10-CM

## 2018-03-05 PROBLEM — Z98.890 HISTORY OF LUMBAR LAMINECTOMY: Status: ACTIVE | Noted: 2018-03-05

## 2018-03-05 RX ORDER — HYDRALAZINE HYDROCHLORIDE 25 MG/1
25 TABLET, FILM COATED ORAL 2 TIMES DAILY
Qty: 180 TAB | Refills: 0 | Status: SHIPPED | OUTPATIENT
Start: 2018-03-05 | End: 2018-04-20 | Stop reason: SINTOL

## 2018-03-05 NOTE — PROGRESS NOTES
Amina Brooks is a 76 y.o. male (: 1940) presenting to address:    Chief Complaint   Patient presents with    Follow-up    Results     discuss lab results       pain scale 0/10       Vitals:    18 0909   BP: 135/72   Pulse: 69   Resp: 18   Temp: 97 °F (36.1 °C)   TempSrc: Oral   SpO2: 97%   Weight: 207 lb (93.9 kg)   Height: 5' 10\" (1.778 m)   PainSc:   0 - No pain       Hearing/Vision:   No exam data present    Learning Assessment:     Learning Assessment 2015   PRIMARY LEARNER Patient   HIGHEST LEVEL OF EDUCATION - PRIMARY LEARNER  > 4 YEARS OF COLLEGE   BARRIERS PRIMARY LEARNER NONE   CO-LEARNER CAREGIVER No   PRIMARY LANGUAGE ENGLISH   LEARNER PREFERENCE PRIMARY OTHER (COMMENT)   ANSWERED BY patient   RELATIONSHIP SELF     Depression Screening:     PHQ over the last two weeks 3/5/2018   Little interest or pleasure in doing things Not at all   Feeling down, depressed or hopeless Not at all   Total Score PHQ 2 0     Fall Risk Assessment:     Fall Risk Assessment, last 12 mths 3/5/2018   Able to walk? Yes   Fall in past 12 months? No     Abuse Screening:     Abuse Screening Questionnaire 2015   Do you ever feel afraid of your partner? N   Are you in a relationship with someone who physically or mentally threatens you? N   Is it safe for you to go home? Y     Coordination of Care Questionaire:   1. Have you been to the ER, urgent care clinic since your last visit? Hospitalized since your last visit? NO    2. Have you seen or consulted any other health care providers outside of the [address] [place] [address] since your last visit? Include any pap smears or colon screening. NO    Advanced Directive:   1. Do you have an Advanced Directive? YES    2. Would you like information on Advanced Directives?  NO

## 2018-03-05 NOTE — MR AVS SNAPSHOT
[address] [place] [address] [address] [facility] [id number] [phone] Patient: Stanley Mayen MRN: [id number] SFC:9/9/2062 Visit Information Date & Time Provider Department Dept. Phone Encounter #  
 3/5/2018  9:00 AM Dereck Robins MD [address] [place] [address] [phone] [id number] Follow-up Instructions Return in about 3 months (around 6/5/2018). Follow-up and Disposition History Your Appointments 6/11/2018 10:00 AM  
ESTABLISHED PATIENT with Dulce Garcia MD  
[facility] [address]) Appt Note: 1 year with psa  
 [address], [address] [facility] [address]  
  
   
 [address], [address] [facility] [address] Upcoming Health Maintenance Date Due  
 GLAUCOMA SCREENING Q2Y 2/1/2018 MEDICARE YEARLY EXAM 12/12/2018 DTaP/Tdap/Td series (2 - Td) 7/19/2026 Allergies as of 3/5/2018  Review Complete On: 3/5/2018 By: Dereck Robins MD  
  
 Severity Noted Reaction Type Reactions Losartan  03/05/2018    Other (comments) Current Immunizations  Never Reviewed Name Date Influenza Vaccine 10/24/2014 12:00 AM  
 Pneumococcal Polysaccharide (PPSV-23) 11/24/2012 12:00 AM  
  
 Not reviewed this visit You Were Diagnosed With   
  
 Codes Comments Essential hypertension    -  Primary ICD-10-CM: I10 
ICD-9-CM: 401.9 Prostate cancer [facility])     ICD-10-CM: Q56 ICD-9-CM: 252 Crohn's disease of small and large intestines with complication ([facility]     EMG-89-YF: J09.439 ICD-9-CM: 555.2 CKD (chronic kidney disease) stage 3, GFR 30-59 ml/min     ICD-10-CM: N18.3 ICD-9-CM: 390. 3 Vitals BP Pulse Temp Resp Height(growth percentile) Weight(growth percentile) 135/72 (BP 1 Location: Right arm, BP Patient Position: Sitting) 69 97 °F (36.1 °C) (Oral) 18 5' 10\" (1.778 m) 207 lb (93.9 kg) SpO2 BMI Smoking Status 97% 29.7 kg/m2 Former Smoker Vitals History BMI and BSA Data Body Mass Index Body Surface Area  
 29.7 kg/m 2 2.15 m 2 Preferred Pharmacy Pharmacy Name Phone [facility] [address] [address] [address] [phone] Your Updated Medication List  
  
   
This list is accurate as of 3/5/18 10:00 AM.  Always use your most recent med list.  
  
  
  
  
 ALBUTEROL IN Take 1-2 Puffs by inhalation every four (4) hours as needed for Wheezing. amLODIPine 5 mg tablet Commonly known as:  Deborha Cords Take 1 Tab by mouth two (2) times a day. Cetirizine 10 mg Cap Take  by mouth.  
  
 cyanocobalamin 1,000 mcg/mL injection Commonly known as:  VITAMIN B12  
1,000 mcg by IntraMUSCular route once. ENTOCORT EC 3 mg capsule Generic drug:  budesonide Take 6 mg by mouth every morning. FLOVENT DISKUS 100 mcg/actuation Dsdv Generic drug:  fluticasone Take  by inhalation. fluticasone-salmeterol 250-50 mcg/dose diskus inhaler Commonly known as:  ADVAIR Take 1 Puff by inhalation daily. hydrALAZINE 25 mg tablet Commonly known as:  APRESOLINE Take 1 Tab by mouth two (2) times a day. Lactobacillus acidophilus Cap Commonly known as:  BACID Take 2 Caps by mouth two (2) times a day. METAMUCIL PO Take  by mouth. NEURONTIN 100 mg capsule Generic drug:  gabapentin Take  by mouth three (3) times daily. PAMELOR PO Take  by mouth nightly as needed. PROBIOTIC 100 billion cell Cap Generic drug:  Lactobac 40-Bifido 3-S.thermop Take 1 Tab by mouth daily. SINGULAIR PO Take  by mouth. SPIRIVA WITH HANDIHALER 18 mcg inhalation capsule Generic drug:  tiotropium Take 1 Cap by inhalation daily. SURFAK PO Take  by mouth. TRICOR PO Take  by mouth. XALATAN 0.005 % ophthalmic solution Generic drug:  latanoprost  
Administer 1 Drop to both eyes nightly. ZANTAC 150 mg tablet Generic drug:  raNITIdine Take 150 mg by mouth two (2) times a day. Prescriptions Sent to Pharmacy Refills  
 hydrALAZINE (APRESOLINE) 25 mg tablet 0 Sig: Take 1 Tab by mouth two (2) times a day. Class: Normal  
 Pharmacy: [facility] [address] Ph #: [phone] Route: Oral  
  
Follow-up Instructions Return in about 3 months (around 6/5/2018). To-Do List   
 03/05/2018 Lab:  METABOLIC PANEL, BASIC Introducing [address]! Dear Wolf Ok: Thank you for requesting a [employer] account. Our records indicate that you have previously registered for a [employer] account but its currently inactive. Please call our [employer] support line at [phone]. Additional Information If you have questions, please visit the Frequently Asked Questions section of the [employer] website at [email or web address] Remember, [employer] is NOT to be used for urgent needs. For medical emergencies, dial 911. Now available from your iPhone and Android! Please provide this summary of care documentation to your next provider. Your primary care clinician is listed as Kris Riojas. If you have any questions after today's visit, please call [phone].

## 2018-03-05 NOTE — PROGRESS NOTES
HISTORY OF PRESENT ILLNESS  Vanesa Rhodes is a 76 y.o. male. HPI  hbp stable  ckd with slowly dropping GFR  crohns disease stable  Metabolic neuropathy resilved  Copd stable  Review of Systems   All other systems reviewed and are negative. Past Medical History:   Diagnosis Date    Asthma     Crohn disease ([facility]     Essential hypertension     Gross hematuria     High cholesterol     History of positive PPD     Hypertrophy of prostate with urinary obstruction and other lower urinary tract symptoms (LUTS)     Impotence of organic origin     Prostate CA ([facility])     [id number], No, Mx, Gl3+4    Prostatitis, unspecified     Urinary frequency      Current Outpatient Prescriptions on File Prior to Visit   Medication Sig Dispense Refill    amLODIPine (NORVASC) 5 mg tablet Take 1 Tab by mouth two (2) times a day. 90 Tab 3    Lactobac 40-Bifido 3-S.thermop (PROBIOTIC) 100 billion cell cap Take 1 Tab by mouth daily.  fluticasone-salmeterol (ADVAIR) 250-50 mcg/dose diskus inhaler Take 1 Puff by inhalation daily.  ALBUTEROL IN Take 1-2 Puffs by inhalation every four (4) hours as needed for Wheezing.  Cetirizine 10 mg cap Take  by mouth.  lactobacillus acidophilus (BACID) cap Take 2 Caps by mouth two (2) times a day.  PSYLLIUM SEED, WITH SUGAR, (METAMUCIL PO) Take  by mouth.  budesonide (ENTOCORT EC) 3 mg capsule Take 6 mg by mouth every morning.  cyanocobalamin (VITAMIN B12) 1,000 mcg/mL injection 1,000 mcg by IntraMUSCular route once.  ranitidine (ZANTAC) 150 mg tablet Take 150 mg by mouth two (2) times a day.  gabapentin (NEURONTIN) 100 mg capsule Take  by mouth three (3) times daily.  tiotropium (SPIRIVA WITH HANDIHALER) 18 mcg inhalation capsule Take 1 Cap by inhalation daily.  MONTELUKAST SODIUM (SINGULAIR PO) Take  by mouth.  NORTRIPTYLINE HCL (PAMELOR PO) Take  by mouth nightly as needed.       FENOFIBRATE NANOCRYSTALLIZED (TRICOR PO) Take by mouth.  fluticasone (FLOVENT DISKUS) 100 mcg/Actuation DsDv Take  by inhalation.  DOCUSATE CALCIUM (SURFAK PO) Take  by mouth.  latanoprost (XALATAN) 0.005 % ophthalmic solution Administer 1 Drop to both eyes nightly. No current facility-administered medications on file prior to visit. Visit Vitals    /72 (BP 1 Location: Right arm, BP Patient Position: Sitting)    Pulse 69    Temp 97 °F (36.1 °C) (Oral)    Resp 18    Ht 5' 10\" (1.778 m)    Wt 207 lb (93.9 kg)    SpO2 97%    BMI 29.7 kg/m2         Physical Exam   Constitutional: He appears well-developed and well-nourished. No distress. Cardiovascular: Normal rate, regular rhythm, normal heart sounds and intact distal pulses. Exam reveals no gallop and no friction rub. No murmur heard. Skin: He is not diaphoretic. Vitals reviewed.   reviewed labs  gfr down to 40    ASSESSMENT and PLAN  hbp   ckd  Plan  Switch losartan/lisinopril to hydralazine 25 bid  Recheck in 3 months  Recheck bmp

## 2018-03-19 ENCOUNTER — TELEPHONE (OUTPATIENT)
Dept: FAMILY MEDICINE CLINIC | Age: 78
End: 2018-03-19

## 2018-03-19 NOTE — TELEPHONE ENCOUNTER
Patient stopped by office to let us know he had an allergic reaction to hydralazine, states his tongue swelled and he had throat pain. Pt states he stopped taking the medication and restarted Lisinopril. Please advise.

## 2018-04-20 ENCOUNTER — HOSPITAL ENCOUNTER (OUTPATIENT)
Dept: LAB | Age: 78
Discharge: HOME | End: 2018-04-20
Payer: MEDICARE

## 2018-04-20 ENCOUNTER — OFFICE VISIT (OUTPATIENT)
Dept: FAMILY MEDICINE CLINIC | Age: 78
End: 2018-04-20

## 2018-04-20 VITALS
BODY MASS INDEX: 29.2 KG/M2 | WEIGHT: 204 LBS | RESPIRATION RATE: 20 BRPM | DIASTOLIC BLOOD PRESSURE: 68 MMHG | HEIGHT: 70 IN | TEMPERATURE: 95.4 F | SYSTOLIC BLOOD PRESSURE: 128 MMHG | HEART RATE: 63 BPM | OXYGEN SATURATION: 99 %

## 2018-04-20 DIAGNOSIS — N18.30 CKD (CHRONIC KIDNEY DISEASE) STAGE 3, GFR 30-59 ML/MIN (HCC): ICD-10-CM

## 2018-04-20 DIAGNOSIS — I10 ESSENTIAL HYPERTENSION: Primary | ICD-10-CM

## 2018-04-20 LAB
ANION GAP SERPL CALC-SCNC: 8 MMOL/L (ref 3–18)
BUN SERPL-MCNC: 24 MG/DL (ref 7–18)
BUN/CREAT SERPL: 15 (ref 12–20)
CALCIUM SERPL-MCNC: 8.7 MG/DL (ref 8.5–10.1)
CHLORIDE SERPL-SCNC: 110 MMOL/L (ref 100–108)
CO2 SERPL-SCNC: 27 MMOL/L (ref 21–32)
CREAT SERPL-MCNC: 1.64 MG/DL (ref 0.6–1.3)
GLUCOSE SERPL-MCNC: 117 MG/DL (ref 74–99)
POTASSIUM SERPL-SCNC: 4.5 MMOL/L (ref 3.5–5.5)
SODIUM SERPL-SCNC: 145 MMOL/L (ref 136–145)

## 2018-04-20 PROCEDURE — 80048 BASIC METABOLIC PNL TOTAL CA: CPT | Performed by: INTERNAL MEDICINE

## 2018-04-20 PROCEDURE — 36415 COLL VENOUS BLD VENIPUNCTURE: CPT | Performed by: INTERNAL MEDICINE

## 2018-04-20 NOTE — PROGRESS NOTES
HISTORY OF PRESENT ILLNESS  Candi Rincon is a 76 y.o. male. HPI  hbp stable  Off hydralazine due to side-effects  Review of Systems   All other systems reviewed and are negative. Past Medical History:   Diagnosis Date    Asthma     Crohn disease ([address]     Essential hypertension     Gross hematuria     High cholesterol     History of positive PPD     Hypertrophy of prostate with urinary obstruction and other lower urinary tract symptoms (LUTS)     Impotence of organic origin     Prostate CA ([facility])     [id number], No, Mx, Gl3+4    Prostatitis, unspecified     Urinary frequency      Current Outpatient Prescriptions on File Prior to Visit   Medication Sig Dispense Refill    amLODIPine (NORVASC) 5 mg tablet Take 1 Tab by mouth two (2) times a day. 90 Tab 3    Lactobac 40-Bifido 3-S.thermop (PROBIOTIC) 100 billion cell cap Take 1 Tab by mouth daily.  fluticasone-salmeterol (ADVAIR) 250-50 mcg/dose diskus inhaler Take 1 Puff by inhalation daily.  ALBUTEROL IN Take 1-2 Puffs by inhalation every four (4) hours as needed for Wheezing.  Cetirizine 10 mg cap Take  by mouth.  lactobacillus acidophilus (BACID) cap Take 2 Caps by mouth two (2) times a day.  PSYLLIUM SEED, WITH SUGAR, (METAMUCIL PO) Take  by mouth.  budesonide (ENTOCORT EC) 3 mg capsule Take 6 mg by mouth every morning.  cyanocobalamin (VITAMIN B12) 1,000 mcg/mL injection 1,000 mcg by IntraMUSCular route once.  ranitidine (ZANTAC) 150 mg tablet Take 150 mg by mouth two (2) times a day.  gabapentin (NEURONTIN) 100 mg capsule Take  by mouth three (3) times daily.  tiotropium (SPIRIVA WITH HANDIHALER) 18 mcg inhalation capsule Take 1 Cap by inhalation daily.  MONTELUKAST SODIUM (SINGULAIR PO) Take  by mouth.  NORTRIPTYLINE HCL (PAMELOR PO) Take  by mouth nightly as needed.  FENOFIBRATE NANOCRYSTALLIZED (TRICOR PO) Take  by mouth.         fluticasone (FLOVENT DISKUS) 100 mcg/Actuation DsDv Take  by inhalation.  DOCUSATE CALCIUM (SURFAK PO) Take  by mouth.  latanoprost (XALATAN) 0.005 % ophthalmic solution Administer 1 Drop to both eyes nightly.  hydrALAZINE (APRESOLINE) 25 mg tablet Take 1 Tab by mouth two (2) times a day. 180 Tab 0     No current facility-administered medications on file prior to visit. Visit Vitals    /68 (BP 1 Location: Right arm, BP Patient Position: Sitting)    Pulse 63    Temp 95.4 °F (35.2 °C) (Oral)    Resp 20    Ht 5' 10\" (1.778 m)    Wt 204 lb (92.5 kg)    SpO2 99%    BMI 29.27 kg/m2       Physical Exam   Constitutional: He appears well-developed and well-nourished. No distress. Cardiovascular: Normal rate, regular rhythm, normal heart sounds and intact distal pulses. Exam reveals no gallop and no friction rub. No murmur heard. Skin: He is not diaphoretic. Vitals reviewed.       ASSESSMENT and PLAN  hbp   Plan  Recheck in 3 months  BMP today

## 2018-04-20 NOTE — PROGRESS NOTES
Elana Marquez is a 76 y.o. male (: 1940) presenting to address:    Chief Complaint   Patient presents with    Medication Evaluation    Hypertension     pain scale 0/10       Vitals:    18 0820   BP: 128/68   Pulse: 63   Resp: 20   Temp: 95.4 °F (35.2 °C)   TempSrc: Oral   SpO2: 99%   Weight: 204 lb (92.5 kg)   Height: 5' 10\" (1.778 m)   PainSc:   0 - No pain       Hearing/Vision:   No exam data present    Learning Assessment:     Learning Assessment 2015   PRIMARY LEARNER Patient   HIGHEST LEVEL OF EDUCATION - PRIMARY LEARNER  > 4 YEARS OF COLLEGE   BARRIERS PRIMARY LEARNER NONE   CO-LEARNER CAREGIVER No   PRIMARY LANGUAGE ENGLISH   LEARNER PREFERENCE PRIMARY OTHER (COMMENT)   ANSWERED BY patient   RELATIONSHIP SELF     Depression Screening:     PHQ over the last two weeks 2018   Little interest or pleasure in doing things Not at all   Feeling down, depressed or hopeless Not at all   Total Score PHQ 2 0     Fall Risk Assessment:     Fall Risk Assessment, last 12 mths 2018   Able to walk? Yes   Fall in past 12 months? No     Abuse Screening:     Abuse Screening Questionnaire 2015   Do you ever feel afraid of your partner? N   Are you in a relationship with someone who physically or mentally threatens you? N   Is it safe for you to go home? Y     Coordination of Care Questionaire:   1. Have you been to the ER, urgent care clinic since your last visit? Hospitalized since your last visit? NO    2. Have you seen or consulted any other health care providers outside of the [address] [place] [address] since your last visit? Include any pap smears or colon screening. YES Dr. Francine Bruce    Advanced Directive:   1. Do you have an Advanced Directive? YES    2. Would you like information on Advanced Directives?  NO

## 2018-04-20 NOTE — MR AVS SNAPSHOT
[address] [facility] 
 
 
 [address] [facility] [id number] [phone] Patient: Vincent Erickson MRN: [id number] SHR:[birth year] Visit Information Date & Time Provider Department Dept. Phone Encounter #  
 4/20/2018  8:30 AM Giovani Wall, 3 [facility] [phone] Follow-up Instructions Return in about 3 months (around 7/20/2018). Follow-up and Disposition History Your Appointments 6/11/2018 10:00 AM  
ESTABLISHED PATIENT with Janiya Watst MD  
[facility]) Appt Note: 1 year with psa  
 [address], [address]  
  
   
 [address], [address]  
  
    
 6/13/2018 10:15 AM  
Follow Up with Giovani Wall MD  
[facility]) Appt Note: 3 month f/u  kmb [address] [facility] [address] [phone]  
  
   
 [address] [facility] [address] [facility] Upcoming Health Maintenance Date Due  
 GLAUCOMA SCREENING Q2Y 2/1/2018 MEDICARE YEARLY EXAM 12/12/2018 DTaP/Tdap/Td series (2 - Td) 7/19/2026 Allergies as of 4/20/2018  Review Complete On: 4/20/2018 By: Giovani Wall MD  
  
 Severity Noted Reaction Type Reactions Hydralazine  04/20/2018    Swelling Losartan  03/05/2018    Other (comments) Current Immunizations  Never Reviewed Name Date Influenza Vaccine 10/24/2014 12:00 AM  
 Pneumococcal Polysaccharide (PPSV-23) 11/24/2012 12:00 AM  
  
 Not reviewed this visit You Were Diagnosed With   
  
 Codes Comments Essential hypertension    -  Primary ICD-10-CM: I10 
ICD-9-CM: 401.9 Vitals BP Pulse Temp Resp Height(growth percentile) Weight(growth percentile)  128/68 (BP 1 Location: Right arm, BP Patient Position: Sitting) 63 95.4 °F (35.2 °C) (Oral) 20 5' 10\" (1.778 m) 204 lb (92.5 kg) SpO2 BMI Smoking Status 99% 29.27 kg/m2 Former Smoker Vitals History BMI and BSA Data Body Mass Index Body Surface Area  
 29.27 kg/m 2 2.14 m 2 Preferred Pharmacy Pharmacy Name Phone [facility], [address] [phone] Your Updated Medication List  
  
   
This list is accurate as of 4/20/18  9:09 AM.  Always use your most recent med list.  
  
  
  
  
 ALBUTEROL IN Take 1-2 Puffs by inhalation every four (4) hours as needed for Wheezing. amLODIPine 5 mg tablet Commonly known as:  Vipin Rings Take 1 Tab by mouth two (2) times a day. Cetirizine 10 mg Cap Take  by mouth.  
  
 cyanocobalamin 1,000 mcg/mL injection Commonly known as:  VITAMIN B12  
1,000 mcg by IntraMUSCular route once. ENTOCORT EC 3 mg capsule Generic drug:  budesonide Take 6 mg by mouth every morning. FLOVENT DISKUS 100 mcg/actuation Dsdv Generic drug:  fluticasone Take  by inhalation. fluticasone-salmeterol 250-50 mcg/dose diskus inhaler Commonly known as:  ADVAIR Take 1 Puff by inhalation daily. Lactobacillus acidophilus Cap Commonly known as:  BACID Take 2 Caps by mouth two (2) times a day. METAMUCIL PO Take  by mouth. NEURONTIN 100 mg capsule Generic drug:  gabapentin Take  by mouth three (3) times daily. PAMELOR PO Take  by mouth nightly as needed. PROBIOTIC 100 billion cell Cap Generic drug:  Lactobac 40-Bifido 3-S.thermop Take 1 Tab by mouth daily. SINGULAIR PO Take  by mouth. SPIRIVA WITH HANDIHALER 18 mcg inhalation capsule Generic drug:  tiotropium Take 1 Cap by inhalation daily. SURFAK PO Take  by mouth. TRICOR PO Take  by mouth. XALATAN 0.005 % ophthalmic solution Generic drug:  latanoprost  
Administer 1 Drop to both eyes nightly. ZANTAC 150 mg tablet Generic drug:  raNITIdine Take 150 mg by mouth two (2) times a day. Follow-up Instructions Return in about 3 months (around 7/20/2018). Introducing [facility] SERVICES! Dear Shiv López: Thank you for requesting a [employer] account. Our records indicate that you have previously registered for a [employer] account but its currently inactive. Please call our [employer] support line at [phone]. Additional Information If you have questions, please visit the Frequently Asked Questions section of the [employer] website at [email or web address] Remember, [employer] is NOT to be used for urgent needs. For medical emergencies, dial 911. Now available from your iPhone and Android! Please provide this summary of care documentation to your next provider. Your primary care clinician is listed as Jovany Ramsay. If you have any questions after today's visit, please call [phone].

## 2018-04-26 RX ORDER — AMLODIPINE BESYLATE 5 MG/1
5 TABLET ORAL 2 TIMES DAILY
Qty: 90 TAB | Refills: 3 | Status: CANCELLED | OUTPATIENT
Start: 2018-04-26

## 2018-04-26 NOTE — TELEPHONE ENCOUNTER
Pt walked in during the time of our system down time and requested a refill on his prescription of Amlodipine Besylate 5mg and a call back when the prescription is filled.

## 2018-04-27 RX ORDER — AMLODIPINE BESYLATE 5 MG/1
TABLET ORAL
Qty: 90 TAB | Refills: 3 | Status: SHIPPED | OUTPATIENT
Start: 2018-04-27 | End: 2020-06-12

## 2018-05-03 ENCOUNTER — TELEPHONE (OUTPATIENT)
Dept: FAMILY MEDICINE CLINIC | Age: 78
End: 2018-05-03

## 2018-05-03 NOTE — TELEPHONE ENCOUNTER
[facility] is waiting on approval to give patient a 90 day supply of the amlodipine. They are requesting a call back because they will not release the patient's medication until they get this clarification.

## 2018-05-03 NOTE — TELEPHONE ENCOUNTER
Reviewed rx, was sent in 1 tab bid #90, hx shows has been taking bid for some time. Called [facility] and gave them the okay to fill for 180 tabs. They will send it to patient.

## 2018-06-13 ENCOUNTER — OFFICE VISIT (OUTPATIENT)
Dept: FAMILY MEDICINE CLINIC | Age: 78
End: 2018-06-13

## 2018-06-13 VITALS
DIASTOLIC BLOOD PRESSURE: 66 MMHG | WEIGHT: 200 LBS | HEART RATE: 67 BPM | RESPIRATION RATE: 20 BRPM | BODY MASS INDEX: 28.63 KG/M2 | OXYGEN SATURATION: 96 % | SYSTOLIC BLOOD PRESSURE: 148 MMHG | HEIGHT: 70 IN | TEMPERATURE: 96.1 F

## 2018-06-13 DIAGNOSIS — I10 ESSENTIAL HYPERTENSION: Primary | ICD-10-CM

## 2018-06-13 DIAGNOSIS — S76.109A INJURY OF QUADRICEPS TENDON: ICD-10-CM

## 2018-06-13 DIAGNOSIS — S89.92XA INJURY OF LEFT KNEE, INITIAL ENCOUNTER: ICD-10-CM

## 2018-06-13 DIAGNOSIS — J20.9 ACUTE BRONCHITIS, UNSPECIFIED ORGANISM: ICD-10-CM

## 2018-06-13 RX ORDER — FLUTICASONE PROPIONATE 50 MCG
2 SPRAY, SUSPENSION (ML) NASAL DAILY
Qty: 1 BOTTLE | Refills: 3 | Status: SHIPPED | OUTPATIENT
Start: 2018-06-13

## 2018-06-13 RX ORDER — PROMETHAZINE HYDROCHLORIDE AND CODEINE PHOSPHATE 6.25; 1 MG/5ML; MG/5ML
5 SOLUTION ORAL
Qty: 120 ML | Refills: 0 | Status: SHIPPED | OUTPATIENT
Start: 2018-06-13 | End: 2018-07-20 | Stop reason: ALTCHOICE

## 2018-06-13 RX ORDER — AZITHROMYCIN 250 MG/1
TABLET, FILM COATED ORAL
Qty: 6 TAB | Refills: 0 | Status: SHIPPED | OUTPATIENT
Start: 2018-06-13 | End: 2019-06-28

## 2018-06-13 RX ORDER — RANITIDINE 150 MG/1
150 TABLET, FILM COATED ORAL 2 TIMES DAILY
Qty: 180 TAB | Refills: 1
Start: 2018-06-13

## 2018-06-13 NOTE — PROGRESS NOTES
HISTORY OF PRESENT ILLNESS  Ben Sanchez is a [de-identified] y.o. male. HPI  hbp stable  Uri > cough x 3 weeks  C/o knee pain x 1 ,onth since fall and injury  Review of Systems   Respiratory: Positive for cough. Musculoskeletal: Positive for joint pain. All other systems reviewed and are negative. Past Medical History:   Diagnosis Date    Asthma     Crohn disease ([address]     Essential hypertension     Gross hematuria     High cholesterol     History of positive PPD     Hypertrophy of prostate with urinary obstruction and other lower urinary tract symptoms (LUTS)     Impotence of organic origin     Prostate CA ([facility])     [id number], No, Mx, Gl3+4    Prostatitis, unspecified     Urinary frequency      Current Outpatient Prescriptions on File Prior to Visit   Medication Sig Dispense Refill    amLODIPine (NORVASC) 5 mg tablet TAKE 1 TABLET TWICE A DAY 90 Tab 3    Lactobac 40-Bifido 3-S.thermop (PROBIOTIC) 100 billion cell cap Take 1 Tab by mouth daily.  fluticasone-salmeterol (ADVAIR) 250-50 mcg/dose diskus inhaler Take 1 Puff by inhalation daily.  ALBUTEROL IN Take 1-2 Puffs by inhalation every four (4) hours as needed for Wheezing.  Cetirizine 10 mg cap Take  by mouth.  lactobacillus acidophilus (BACID) cap Take 2 Caps by mouth two (2) times a day.  PSYLLIUM SEED, WITH SUGAR, (METAMUCIL PO) Take  by mouth.  budesonide (ENTOCORT EC) 3 mg capsule Take 6 mg by mouth every morning.  cyanocobalamin (VITAMIN B12) 1,000 mcg/mL injection 1,000 mcg by IntraMUSCular route once.  ranitidine (ZANTAC) 150 mg tablet Take 150 mg by mouth two (2) times a day.  gabapentin (NEURONTIN) 100 mg capsule Take  by mouth three (3) times daily.  tiotropium (SPIRIVA WITH HANDIHALER) 18 mcg inhalation capsule Take 1 Cap by inhalation daily.  MONTELUKAST SODIUM (SINGULAIR PO) Take  by mouth.  NORTRIPTYLINE HCL (PAMELOR PO) Take  by mouth nightly as needed.       FENOFIBRATE NANOCRYSTALLIZED (TRICOR PO) Take  by mouth.  fluticasone (FLOVENT DISKUS) 100 mcg/Actuation DsDv Take  by inhalation.  DOCUSATE CALCIUM (SURFAK PO) Take  by mouth.  latanoprost (XALATAN) 0.005 % ophthalmic solution Administer 1 Drop to both eyes nightly. No current facility-administered medications on file prior to visit. Visit Vitals    /66 (BP 1 Location: Right arm, BP Patient Position: Sitting)    Pulse 67    Temp 96.1 °F (35.6 °C) (Oral)    Resp 20    Ht 5' 10\" (1.778 m)    Wt 200 lb (90.7 kg)    SpO2 96%    BMI 28.7 kg/m2       Physical Exam   Constitutional: He appears well-developed and well-nourished. No distress. Pulmonary/Chest: Effort normal. No respiratory distress. He has no wheezes. He has no rales. He exhibits no tenderness. ronchi x 2   Musculoskeletal: Normal range of motion. He exhibits tenderness. He exhibits no edema or deformity. - instability l knee   Skin: He is not diaphoretic. Vitals reviewed.       ASSESSMENT and PLAN  hbp   Knee injury  Acute bronchitis  Plan  xr l knee  z-garo  Phenergan with codeine

## 2018-06-13 NOTE — PROGRESS NOTES
Freya Kunz is a [de-identified] y.o. male (: 1940) presenting to address:    Chief Complaint   Patient presents with    Hypertension    Knee Pain     LT    pain scale 4/10       Vitals:    18 0943   BP: 148/66   Pulse: 67   Resp: 20   Temp: 96.1 °F (35.6 °C)   TempSrc: Oral   SpO2: 96%   Weight: 200 lb (90.7 kg)   Height: 5' 10\" (1.778 m)   PainSc:   4   PainLoc: Knee       Hearing/Vision:   No exam data present    Learning Assessment:     Learning Assessment 2015   PRIMARY LEARNER Patient   HIGHEST LEVEL OF EDUCATION - PRIMARY LEARNER  > 4 YEARS OF COLLEGE   BARRIERS PRIMARY LEARNER NONE   CO-LEARNER CAREGIVER No   PRIMARY LANGUAGE ENGLISH   LEARNER PREFERENCE PRIMARY OTHER (COMMENT)   ANSWERED BY patient   RELATIONSHIP SELF     Depression Screening:     PHQ over the last two weeks 2018   Little interest or pleasure in doing things Not at all   Feeling down, depressed or hopeless Not at all   Total Score PHQ 2 0     Fall Risk Assessment:     Fall Risk Assessment, last 12 mths 2018   Able to walk? Yes   Fall in past 12 months? No     Abuse Screening:     Abuse Screening Questionnaire 2015   Do you ever feel afraid of your partner? N   Are you in a relationship with someone who physically or mentally threatens you? N   Is it safe for you to go home? Y     Coordination of Care Questionaire:   1. Have you been to the ER, urgent care clinic since your last visit? Hospitalized since your last visit? NO    2. Have you seen or consulted any other health care providers outside of the [address] [place] [address] since your last visit? Include any pap smears or colon screening. NO    Advanced Directive:   1. Do you have an Advanced Directive? YES    2. Would you like information on Advanced Directives?  NO

## 2018-06-13 NOTE — MR AVS SNAPSHOT
[facility] 
 
 
 [address] [facility] [address] 
[phone] Patient: Mery Ojeda MRN: [id number] RNX:8/4/7883 Visit Information Date & Time Provider Department Dept. Phone Encounter #  
 6/13/2018 10:15 AM Lashon Green MD 3 [facility] [phone] [id number] Follow-up Instructions Return in about 6 months (around 12/13/2018). Follow-up and Disposition History Your Appointments 7/20/2018  8:30 AM  
Follow Up with Lashon Green MD  
3 [facility]) Appt Note: 3 month f/u  
 [address] [facility] [address]  
[phone]  
  
   
 [address] [facility]  
  
    
 12/3/2018  2:50 PM  
Nurse Visit with [facility] Appt Note: Return for tech visit 6 months psa  
 [address] [facility] 2 [place] [id number]  
  
    
 6/10/2019 10:45 AM  
ESTABLISHED PATIENT with Chris Garcia MD  
[facility] ([facility]) Appt Note: dr boone appt 12 mos with PSA. [address], [address] [facility] [address]  
  
   
 [address], [address] [facility] [address] Upcoming Health Maintenance Date Due  
 GLAUCOMA SCREENING Q2Y 2/1/2018 Influenza Age 5 to Adult 8/1/2018 MEDICARE YEARLY EXAM 12/12/2018 DTaP/Tdap/Td series (2 - Td) 7/19/2026 Allergies as of 6/13/2018  Review Complete On: 6/13/2018 By: Lashon Green MD  
  
 Severity Noted Reaction Type Reactions Hydralazine  04/20/2018    Swelling Losartan  03/05/2018    Other (comments) Current Immunizations  Never Reviewed Name Date  Influenza Vaccine 10/24/2014 12:00 AM  
 Pneumococcal Polysaccharide (PPSV-23) 11/24/2012 12:00 AM  
  
 Not reviewed this visit You Were Diagnosed With   
  
 Codes Comments Essential hypertension    -  Primary ICD-10-CM: I10 
ICD-9-CM: 401.9 Acute bronchitis, unspecified organism     ICD-10-CM: J20.9 ICD-9-CM: 466.0 Injury of left knee, initial encounter     ICD-10-CM: S89. 92XA ICD-9-CM: 343. 7 Vitals BP Pulse Temp Resp Height(growth percentile) Weight(growth percentile) 148/66 (BP 1 Location: Right arm, BP Patient Position: Sitting) 67 96.1 °F (35.6 °C) (Oral) 20 5' 10\" (1.778 m) 200 lb (90.7 kg) SpO2 BMI Smoking Status 96% 28.7 kg/m2 Former Smoker Vitals History BMI and BSA Data Body Mass Index Body Surface Area 28.7 kg/m 2 2.12 m 2 [facility] [address] [phone] Your Updated Medication List  
  
   
This list is accurate as of 6/13/18 10:48 AM.  Always use your most recent med list.  
  
  
  
  
 ALBUTEROL IN Take 1-2 Puffs by inhalation every four (4) hours as needed for Wheezing. amLODIPine 5 mg tablet Commonly known as:  Erenest Feng TAKE 1 TABLET TWICE A DAY  
  
 azithromycin 250 mg tablet Commonly known as:  Charly Forrester Take as directed Cetirizine 10 mg Cap Take  by mouth.  
  
 cyanocobalamin 1,000 mcg/mL injection Commonly known as:  VITAMIN B12  
1,000 mcg by IntraMUSCular route once. ENTOCORT EC 3 mg capsule Generic drug:  budesonide Take 6 mg by mouth every morning. * fluticasone 50 mcg/actuation nasal spray Commonly known as:  Sahuarita Galla 2 Sprays by Both Nostrils route daily. * FLOVENT DISKUS 100 mcg/actuation Dsdv Generic drug:  fluticasone Take  by inhalation. fluticasone-salmeterol 250-50 mcg/dose diskus inhaler Commonly known as:  ADVAIR Take 1 Puff by inhalation daily. Lactobacillus acidophilus Cap Commonly known as:  BACID Take 2 Caps by mouth two (2) times a day. METAMUCIL PO Take  by mouth. NEURONTIN 100 mg capsule Generic drug:  gabapentin Take  by mouth three (3) times daily. PAMELOR PO Take  by mouth nightly as needed. PROBIOTIC 100 billion cell Cap Generic drug:  Lactobac 40-Bifido 3-S.thermop Take 1 Tab by mouth daily. promethazine-codeine 6.25-10 mg/5 mL syrup Commonly known as:  PHENERGAN with CODEINE Take 5 mL by mouth every six (6) hours as needed for Cough. Max Daily Amount: 20 mL. SINGULAIR PO Take  by mouth. * tiotropium 18 mcg inhalation capsule Commonly known as:  Cassie Morgans Take 1 Cap by inhalation daily. * SPIRIVA WITH HANDIHALER 18 mcg inhalation capsule Generic drug:  tiotropium Take 1 Cap by inhalation daily. SURFAK PO Take  by mouth. TRICOR PO Take  by mouth. XALATAN 0.005 % ophthalmic solution Generic drug:  latanoprost  
Administer 1 Drop to both eyes nightly. * raNITIdine 150 mg tablet Commonly known as:  ZANTAC Take 1 Tab by mouth two (2) times a day. * ZANTAC 150 mg tablet Generic drug:  raNITIdine Take 150 mg by mouth two (2) times a day. * Notice: This list has 6 medication(s) that are the same as other medications prescribed for you. Read the directions carefully, and ask your doctor or other care provider to review them with you. Prescriptions Printed Refills  
 promethazine-codeine (PHENERGAN WITH CODEINE) 6.25-10 mg/5 mL syrup 0 Sig: Take 5 mL by mouth every six (6) hours as needed for Cough. Max Daily Amount: 20 mL. Class: Print Route: Oral  
  
Prescriptions Sent to Pharmacy Refills  
 fluticasone (FLONASE) 50 mcg/actuation nasal spray 3 Si Sprays by Both Nostrils route daily. Class: Normal  
 Pharmacy: [place] [address], [address] [facility] #: [phone] Route: Both Nostrils  
 azithromycin (ZITHROMAX) 250 mg tablet 0 Sig: Take as directed Class: Normal  
 Pharmacy: [facility] [address], [address] Ph #: [phone] Follow-up Instructions Return in about 6 months (around 12/13/2018). To-Do List   
 06/13/2018 Imaging:  XR KNEE LT MIN 4 V Introducing [facility] & HEALTH SERVICES! Dear Martha Martin: Thank you for requesting a [employer] account. Our records indicate that you have previously registered for a [employer] account but its currently inactive. Please call our [employer] support line at [phone]. Additional Information If you have questions, please visit the Frequently Asked Questions section of the [employer] website at [email or web address] Remember, [employer] is NOT to be used for urgent needs. For medical emergencies, dial 911. Now available from your iPhone and Android! Please provide this summary of care documentation to your next provider. Your primary care clinician is listed as Sumanth Wright. If you have any questions after today's visit, please call [phone].

## 2018-07-20 ENCOUNTER — OFFICE VISIT (OUTPATIENT)
Dept: FAMILY MEDICINE CLINIC | Age: 78
End: 2018-07-20

## 2018-07-20 VITALS
HEART RATE: 59 BPM | SYSTOLIC BLOOD PRESSURE: 133 MMHG | DIASTOLIC BLOOD PRESSURE: 58 MMHG | TEMPERATURE: 95.3 F | WEIGHT: 199 LBS | RESPIRATION RATE: 18 BRPM | HEIGHT: 70 IN | OXYGEN SATURATION: 100 % | BODY MASS INDEX: 28.49 KG/M2

## 2018-07-20 DIAGNOSIS — J20.9 ACUTE BRONCHITIS, UNSPECIFIED ORGANISM: ICD-10-CM

## 2018-07-20 DIAGNOSIS — I10 ESSENTIAL HYPERTENSION: Primary | ICD-10-CM

## 2018-07-20 NOTE — PROGRESS NOTES
Tracy Cornelius is a [de-identified] y.o. male (: 1940) presenting to address:    Chief Complaint   Patient presents with    Follow-up     pain scale 0/10       Vitals:    18 0823   BP: 133/58   Pulse: (!) 59   Resp: 18   Temp: 95.3 °F (35.2 °C)   TempSrc: Oral   SpO2: 100%   Weight: 199 lb (90.3 kg)   Height: 5' 10\" (1.778 m)   PainSc:   0 - No pain       Hearing/Vision:   No exam data present    Learning Assessment:     Learning Assessment 2015   PRIMARY LEARNER Patient   HIGHEST LEVEL OF EDUCATION - PRIMARY LEARNER  > 4 YEARS OF COLLEGE   BARRIERS PRIMARY LEARNER NONE   CO-LEARNER CAREGIVER No   PRIMARY LANGUAGE ENGLISH   LEARNER PREFERENCE PRIMARY OTHER (COMMENT)   ANSWERED BY patient   RELATIONSHIP SELF     Depression Screening:     PHQ over the last two weeks 2018   Little interest or pleasure in doing things Not at all   Feeling down, depressed, irritable, or hopeless Not at all   Total Score PHQ 2 0     Fall Risk Assessment:     Fall Risk Assessment, last 12 mths 2018   Able to walk? Yes   Fall in past 12 months? No     Abuse Screening:     Abuse Screening Questionnaire 2015   Do you ever feel afraid of your partner? N   Are you in a relationship with someone who physically or mentally threatens you? N   Is it safe for you to go home? Y     Coordination of Care Questionaire:   1. Have you been to the ER, urgent care clinic since your last visit? Hospitalized since your last visit? NO    2. Have you seen or consulted any other health care providers outside of the [address] [place] [address] since your last visit? Include any pap smears or colon screening. NO    Advanced Directive:   1. Do you have an Advanced Directive? YES    2. Would you like information on Advanced Directives?  NO

## 2018-07-20 NOTE — MR AVS SNAPSHOT
[facility] 
 
 
 [address] [facility] [address] 
[phone] Patient: Kat Linder MRN: [id number] STAR:[birth year] Visit Information Date & Time Provider Department Dept. Phone Encounter #  
 7/20/2018  8:30 AM Bianca Pal, [address] [facility] [address] [phone] [id number] Follow-up Instructions Return in about 6 months (around 1/20/2019). Follow-up and Disposition History Your Appointments 12/3/2018  2:50 PM  
Nurse Visit with [facility] NURSE [facility]. [facility] [address] ([address]) Appt Note: Return for tech visit 6 months psa  
 [address] [facility] [address] [facility] [address] [id number]  
  
    
 6/10/2019 10:45 AM  
ESTABLISHED PATIENT with Diane Santos MD  
[facility] ([address]) Appt Note: dr boone appt 12 mos with PSA. [address] [facility], [address]  
  
   
 [address] [facility], [address] [facility] [address] Upcoming Health Maintenance Date Due  
 GLAUCOMA SCREENING Q2Y 2/1/2018 Influenza Age 5 to Adult 8/1/2018 MEDICARE YEARLY EXAM 12/12/2018 DTaP/Tdap/Td series (2 - Td) 7/19/2026 Allergies as of 7/20/2018  Review Complete On: 7/20/2018 By: Bianca Pal MD  
  
 Severity Noted Reaction Type Reactions Hydralazine  04/20/2018    Swelling Losartan  03/05/2018    Other (comments) Current Immunizations  Never Reviewed Name Date Influenza Vaccine 10/24/2014 12:00 AM  
 Pneumococcal Polysaccharide (PPSV-23) 11/24/2012 12:00 AM  
  
 Not reviewed this visit You Were Diagnosed With   
  
 Codes Comments Essential hypertension    -  Primary ICD-10-CM: I10 
ICD-9-CM: 401.9 Acute bronchitis, unspecified organism     ICD-10-CM: J20.9 ICD-9-CM: 466.0 Vitals BP Pulse Temp Resp Height(growth percentile) Weight(growth percentile) 133/58 (BP 1 Location: Right arm, BP Patient Position: Sitting) (!) 59 95.3 °F (35.2 °C) (Oral) 18 5' 10\" (1.778 m) 199 lb (90.3 kg) SpO2 BMI Smoking Status 100% 28.55 kg/m2 Former Smoker Vitals History BMI and BSA Data Body Mass Index Body Surface Area 28.55 kg/m 2 2.11 m 2 Preferred Pharmacy Pharmacy Name Phone [facility], [address] [phone] Your Updated Medication List  
  
   
This list is accurate as of 7/20/18  9:21 AM.  Always use your most recent med list.  
  
  
  
  
 ALBUTEROL IN Take 1-2 Puffs by inhalation every four (4) hours as needed for Wheezing. amLODIPine 5 mg tablet Commonly known as:  Barbara Spruce TAKE 1 TABLET TWICE A DAY  
  
 azithromycin 250 mg tablet Commonly known as:  Graciela Paramus Take as directed Cetirizine 10 mg Cap Take  by mouth.  
  
 cyanocobalamin 1,000 mcg/mL injection Commonly known as:  VITAMIN B12  
1,000 mcg by IntraMUSCular route once. ENTOCORT EC 3 mg capsule Generic drug:  budesonide Take 6 mg by mouth every morning. * fluticasone 50 mcg/actuation nasal spray Commonly known as:  Segundo Redo 2 Sprays by Both Nostrils route daily. * FLOVENT DISKUS 100 mcg/actuation Dsdv Generic drug:  fluticasone Take  by inhalation. fluticasone-salmeterol 250-50 mcg/dose diskus inhaler Commonly known as:  ADVAIR Take 1 Puff by inhalation daily. Lactobacillus acidophilus Cap Commonly known as:  BACID Take 2 Caps by mouth two (2) times a day. METAMUCIL PO Take  by mouth. NEURONTIN 100 mg capsule Generic drug:  gabapentin Take  by mouth three (3) times daily. PAMELOR PO Take  by mouth nightly as needed. PROBIOTIC 100 billion cell Cap Generic drug:  Lactobac 40-Bifido 3-S.thermop Take 1 Tab by mouth daily. Saliva Substitution Combo No.3 Spra 2 Sprays by Mucous Membrane route as needed. SINGULAIR PO Take  by mouth. * tiotropium 18 mcg inhalation capsule Commonly known as:  Gloriajean Coombes Take 1 Cap by inhalation daily. * SPIRIVA WITH HANDIHALER 18 mcg inhalation capsule Generic drug:  tiotropium Take 1 Cap by inhalation daily. SURFAK PO Take  by mouth. TRICOR PO Take  by mouth. XALATAN 0.005 % ophthalmic solution Generic drug:  latanoprost  
Administer 1 Drop to both eyes nightly. * raNITIdine 150 mg tablet Commonly known as:  ZANTAC Take 1 Tab by mouth two (2) times a day. * ZANTAC 150 mg tablet Generic drug:  raNITIdine Take 150 mg by mouth two (2) times a day. * Notice: This list has 6 medication(s) that are the same as other medications prescribed for you. Read the directions carefully, and ask your doctor or other care provider to review them with you. Prescriptions Printed Refills Saliva Substitution Combo No.3 spra 1 Si Sprays by Mucous Membrane route as needed. Class: Print Route: Mucous Membrane Follow-up Instructions Return in about 6 months (around 2019). Introducing [facility] & HEALTH SERVICES! [employer] introduces [employer] patient portal. Now you can access parts of your medical record, email your doctor's office, and request medication refills online. 1. In your internet browser, go to [email or web address] 2. Click on the First Time User? Click Here link in the Sign In box. You will see the New Member Sign Up page. 3. Enter your [employer] Access Code exactly as it appears below. You will not need to use this code after youve completed the sign-up process. If you do not sign up before the expiration date, you must request a new code. · [employer] Access Code: [email or web address] Expires: 2018  2:43 PM 
 
 4. Enter the last four digits of your Social Security Number (xxxx) and Date of Birth (mm/dd/yyyy) as indicated and click Submit. You will be taken to the next sign-up page. 5. Create a Nuvyyo ID. This will be your [employer] login ID and cannot be changed, so think of one that is secure and easy to remember. 6. Create a Nuvyyo password. You can change your password at any time. 7. Enter your Password Reset Question and Answer. This can be used at a later time if you forget your password. 8. Enter your e-mail address. You will receive e-mail notification when new information is available in [address] 9. Click Sign Up. You can now view and download portions of your medical record. 10. Click the Download Summary menu link to download a portable copy of your medical information. If you have questions, please visit the Frequently Asked Questions section of the [employer] website. Remember, [employer] is NOT to be used for urgent needs. For medical emergencies, dial 911. Now available from your iPhone and Android! Please provide this summary of care documentation to your next provider. Your primary care clinician is listed as Gray Parks. If you have any questions after today's visit, please call [phone].

## 2018-07-20 NOTE — PROGRESS NOTES
HISTORY OF PRESENT ILLNESS  Jose Guardado is a [de-identified] y.o. male. HPI  hbp stable  Knee improved  Review of Systems   Musculoskeletal: Positive for joint pain. All other systems reviewed and are negative. Past Medical History:   Diagnosis Date    Asthma     Crohn disease ([address]     Essential hypertension     Gross hematuria     High cholesterol     History of positive PPD     Hypertrophy of prostate with urinary obstruction and other lower urinary tract symptoms (LUTS)     Impotence of organic origin     Prostate CA (HCC)     [id number], No, Mx, Gl3+4    Prostatitis, unspecified     Urinary frequency      Current Outpatient Prescriptions on File Prior to Visit   Medication Sig Dispense Refill    tiotropium (SPIRIVA) 18 mcg inhalation capsule Take 1 Cap by inhalation daily. 90 Cap 3    fluticasone (FLONASE) 50 mcg/actuation nasal spray 2 Sprays by Both Nostrils route daily. 1 Bottle 3    raNITIdine (ZANTAC) 150 mg tablet Take 1 Tab by mouth two (2) times a day. 180 Tab 1    azithromycin (ZITHROMAX) 250 mg tablet Take as directed 6 Tab 0    amLODIPine (NORVASC) 5 mg tablet TAKE 1 TABLET TWICE A DAY 90 Tab 3    Lactobac 40-Bifido 3-S.thermop (PROBIOTIC) 100 billion cell cap Take 1 Tab by mouth daily.  fluticasone-salmeterol (ADVAIR) 250-50 mcg/dose diskus inhaler Take 1 Puff by inhalation daily.  ALBUTEROL IN Take 1-2 Puffs by inhalation every four (4) hours as needed for Wheezing.  Cetirizine 10 mg cap Take  by mouth.  lactobacillus acidophilus (BACID) cap Take 2 Caps by mouth two (2) times a day.  PSYLLIUM SEED, WITH SUGAR, (METAMUCIL PO) Take  by mouth.  budesonide (ENTOCORT EC) 3 mg capsule Take 6 mg by mouth every morning.  cyanocobalamin (VITAMIN B12) 1,000 mcg/mL injection 1,000 mcg by IntraMUSCular route once.  ranitidine (ZANTAC) 150 mg tablet Take 150 mg by mouth two (2) times a day.         gabapentin (NEURONTIN) 100 mg capsule Take  by mouth three (3) times daily.  tiotropium (SPIRIVA WITH HANDIHALER) 18 mcg inhalation capsule Take 1 Cap by inhalation daily.  MONTELUKAST SODIUM (SINGULAIR PO) Take  by mouth.  NORTRIPTYLINE HCL (PAMELOR PO) Take  by mouth nightly as needed.  FENOFIBRATE NANOCRYSTALLIZED (TRICOR PO) Take  by mouth.  fluticasone (FLOVENT DISKUS) 100 mcg/Actuation DsDv Take  by inhalation.  DOCUSATE CALCIUM (SURFAK PO) Take  by mouth.  latanoprost (XALATAN) 0.005 % ophthalmic solution Administer 1 Drop to both eyes nightly. No current facility-administered medications on file prior to visit. Visit Vitals    /58 (BP 1 Location: Right arm, BP Patient Position: Sitting)    Pulse (!) 59    Temp 95.3 °F (35.2 °C) (Oral)    Resp 18    Ht 5' 10\" (1.778 m)    Wt 199 lb (90.3 kg)    SpO2 100%    BMI 28.55 kg/m2         Physical Exam   Constitutional: He appears well-developed and well-nourished. No distress. Cardiovascular: Normal rate, regular rhythm, normal heart sounds and intact distal pulses. Exam reveals no gallop and no friction rub. No murmur heard. Skin: He is not diaphoretic.        ASSESSMENT and PLAN  hbp   Plan  Recheck in 6 months

## 2019-08-29 ENCOUNTER — PATIENT OUTREACH (OUTPATIENT)
Dept: FAMILY MEDICINE CLINIC | Age: 79
End: 2019-08-29

## 2019-08-29 NOTE — PROGRESS NOTES
Called patient after receiving notification of recent hospitalization. Patient states that he is still seeing Dr. Chelsi Alonzo at his new location and the he requested records be sent several months ago. Will notify office so that they can make sure it has been done.     Evon Herrmann RN

## 2020-05-12 NOTE — PROGRESS NOTES
Call, kidney function unchanged which is good, continue current care  Recheck bmp/ov in 6 months Xolair Counseling:  Patient informed of potential adverse effects including but not limited to fever, muscle aches, rash and allergic reactions.  The patient verbalized understanding of the proper use and possible adverse effects of Xolair.  All of the patient's questions and concerns were addressed.